# Patient Record
Sex: FEMALE | Race: WHITE | Employment: UNEMPLOYED | URBAN - METROPOLITAN AREA
[De-identification: names, ages, dates, MRNs, and addresses within clinical notes are randomized per-mention and may not be internally consistent; named-entity substitution may affect disease eponyms.]

---

## 2020-02-07 PROBLEM — O43.199 MARGINAL INSERTION OF UMBILICAL CORD AFFECTING MANAGEMENT OF MOTHER: Status: ACTIVE | Noted: 2020-02-07

## 2020-02-07 PROBLEM — Z34.00 NORMAL PREGNANCY, FIRST: Status: ACTIVE | Noted: 2020-02-07

## 2020-04-15 PROBLEM — O99.013 ANEMIA AFFECTING PREGNANCY IN THIRD TRIMESTER: Status: ACTIVE | Noted: 2020-04-15

## 2020-06-10 ENCOUNTER — HOSPITAL ENCOUNTER (OUTPATIENT)
Dept: LAB | Age: 20
Discharge: HOME | End: 2020-06-10
Payer: COMMERCIAL

## 2020-06-10 DIAGNOSIS — O99.013 ANEMIA AFFECTING PREGNANCY IN THIRD TRIMESTER: ICD-10-CM

## 2020-06-10 LAB
ALBUMIN SERPL-MCNC: 2.8 G/DL (ref 3.5–5)
ALBUMIN/GLOB SERPL: 0.7 {RATIO} (ref 1.2–3.5)
ALP SERPL-CCNC: 190 U/L (ref 50–136)
ALT SERPL-CCNC: 25 U/L (ref 12–65)
ANION GAP SERPL CALC-SCNC: 10 MMOL/L (ref 7–16)
APPEARANCE UR: ABNORMAL
AST SERPL-CCNC: 19 U/L (ref 15–37)
BACTERIA URNS QL MICRO: ABNORMAL /HPF
BASOPHILS # BLD: 0.1 K/UL (ref 0–0.2)
BASOPHILS NFR BLD: 0 % (ref 0–2)
BILIRUB SERPL-MCNC: 0.3 MG/DL (ref 0.2–1.1)
BILIRUB UR QL: NEGATIVE
BUN SERPL-MCNC: 4 MG/DL (ref 6–23)
CALCIUM SERPL-MCNC: 8.6 MG/DL (ref 8.3–10.4)
CASTS URNS QL MICRO: 0 /LPF
CHLORIDE SERPL-SCNC: 105 MMOL/L (ref 98–107)
CO2 SERPL-SCNC: 21 MMOL/L (ref 21–32)
COLOR UR: YELLOW
CREAT SERPL-MCNC: 0.7 MG/DL (ref 0.6–1)
CRYSTALS URNS QL MICRO: 0 /LPF
DIFFERENTIAL METHOD BLD: ABNORMAL
EOSINOPHIL # BLD: 0.3 K/UL (ref 0–0.8)
EOSINOPHIL NFR BLD: 2 % (ref 0.5–7.8)
EPI CELLS #/AREA URNS HPF: ABNORMAL /HPF
ERYTHROCYTE [DISTWIDTH] IN BLOOD BY AUTOMATED COUNT: 14.5 % (ref 11.9–14.6)
ERYTHROCYTE [SEDIMENTATION RATE] IN BLOOD: 59 MM/HR (ref 0–20)
FERRITIN SERPL-MCNC: 4 NG/ML (ref 8–388)
FOLATE SERPL-MCNC: 10.9 NG/ML (ref 3.1–17.5)
GLOBULIN SER CALC-MCNC: 4 G/DL (ref 2.3–3.5)
GLUCOSE SERPL-MCNC: 156 MG/DL (ref 65–100)
GLUCOSE UR STRIP.AUTO-MCNC: NEGATIVE MG/DL
HCT VFR BLD AUTO: 30.5 % (ref 35.8–46.3)
HGB BLD-MCNC: 9.5 G/DL (ref 11.7–15.4)
HGB RETIC QN AUTO: 25 PG (ref 29–35)
HGB UR QL STRIP: ABNORMAL
IGA SERPL-MCNC: 203 MG/DL (ref 85–499)
IGG SERPL-MCNC: 731 MG/DL (ref 610–1616)
IGM SERPL-MCNC: 95 MG/DL (ref 35–242)
IMM GRANULOCYTES # BLD AUTO: 0.4 K/UL (ref 0–0.5)
IMM GRANULOCYTES NFR BLD AUTO: 2 % (ref 0–5)
IMM RETICS NFR: 35.4 % (ref 3–15.9)
IRON SATN MFR SERPL: 4 %
IRON SERPL-MCNC: 24 UG/DL (ref 35–150)
KETONES UR QL STRIP.AUTO: 15 MG/DL
LDH SERPL L TO P-CCNC: 190 U/L (ref 100–190)
LEUKOCYTE ESTERASE UR QL STRIP.AUTO: ABNORMAL
LYMPHOCYTES # BLD: 2.8 K/UL (ref 0.5–4.6)
LYMPHOCYTES NFR BLD: 18 % (ref 13–44)
MCH RBC QN AUTO: 27 PG (ref 26.1–32.9)
MCHC RBC AUTO-ENTMCNC: 31.1 G/DL (ref 31.4–35)
MCV RBC AUTO: 86.6 FL (ref 79.6–97.8)
MONOCYTES # BLD: 0.7 K/UL (ref 0.1–1.3)
MONOCYTES NFR BLD: 4 % (ref 4–12)
MUCOUS THREADS URNS QL MICRO: 0 /LPF
NEUTS SEG # BLD: 11.2 K/UL (ref 1.7–8.2)
NEUTS SEG NFR BLD: 73 % (ref 43–78)
NITRITE UR QL STRIP.AUTO: POSITIVE
NRBC # BLD: 0.03 K/UL (ref 0–0.2)
OTHER OBSERVATIONS,UCOM: ABNORMAL
PH UR STRIP: 7 [PH] (ref 5–9)
PHOSPHATE SERPL-MCNC: 3.5 MG/DL (ref 2.5–4.5)
PLATELET # BLD AUTO: 269 K/UL (ref 150–450)
PMV BLD AUTO: 10.6 FL (ref 9.4–12.3)
POTASSIUM SERPL-SCNC: 3.6 MMOL/L (ref 3.5–5.1)
PROT SERPL-MCNC: 6.8 G/DL (ref 6.3–8.2)
PROT UR STRIP-MCNC: 30 MG/DL
RBC # BLD AUTO: 3.52 M/UL (ref 4.05–5.25)
RBC #/AREA URNS HPF: ABNORMAL /HPF
RETICS # AUTO: 0.08 M/UL (ref 0.03–0.1)
RETICS/RBC NFR AUTO: 2.4 % (ref 0.3–2)
SODIUM SERPL-SCNC: 136 MMOL/L (ref 136–145)
SP GR UR REFRACTOMETRY: 1.02 (ref 1–1.02)
T4 FREE SERPL-MCNC: 1 NG/DL (ref 0.78–1.46)
TIBC SERPL-MCNC: 611 UG/DL (ref 250–450)
TSH SERPL DL<=0.005 MIU/L-ACNC: 1.74 UIU/ML (ref 0.36–3.74)
UA: UC IF INDICATED,UAUC: ABNORMAL
URATE SERPL-MCNC: 3 MG/DL (ref 2.6–6)
UROBILINOGEN UR QL STRIP.AUTO: 0.2 EU/DL (ref 0.2–1)
VIT B12 SERPL-MCNC: 278 PG/ML (ref 193–986)
WBC # BLD AUTO: 15.4 K/UL (ref 4.3–11.1)
WBC URNS QL MICRO: ABNORMAL /HPF

## 2020-06-10 PROCEDURE — 87186 SC STD MICRODIL/AGAR DIL: CPT

## 2020-06-10 PROCEDURE — 84443 ASSAY THYROID STIM HORMONE: CPT

## 2020-06-10 PROCEDURE — 83615 LACTATE (LD) (LDH) ENZYME: CPT

## 2020-06-10 PROCEDURE — 87088 URINE BACTERIA CULTURE: CPT

## 2020-06-10 PROCEDURE — 84550 ASSAY OF BLOOD/URIC ACID: CPT

## 2020-06-10 PROCEDURE — 80053 COMPREHEN METABOLIC PANEL: CPT

## 2020-06-10 PROCEDURE — 85046 RETICYTE/HGB CONCENTRATE: CPT

## 2020-06-10 PROCEDURE — 84100 ASSAY OF PHOSPHORUS: CPT

## 2020-06-10 PROCEDURE — 82607 VITAMIN B-12: CPT

## 2020-06-10 PROCEDURE — 82747 ASSAY OF FOLIC ACID RBC: CPT

## 2020-06-10 PROCEDURE — 85025 COMPLETE CBC W/AUTO DIFF WBC: CPT

## 2020-06-10 PROCEDURE — 81001 URINALYSIS AUTO W/SCOPE: CPT

## 2020-06-10 PROCEDURE — 83540 ASSAY OF IRON: CPT

## 2020-06-10 PROCEDURE — 86038 ANTINUCLEAR ANTIBODIES: CPT

## 2020-06-10 PROCEDURE — 83021 HEMOGLOBIN CHROMOTOGRAPHY: CPT

## 2020-06-10 PROCEDURE — 82784 ASSAY IGA/IGD/IGG/IGM EACH: CPT

## 2020-06-10 PROCEDURE — 84238 ASSAY NONENDOCRINE RECEPTOR: CPT

## 2020-06-10 PROCEDURE — 36415 COLL VENOUS BLD VENIPUNCTURE: CPT

## 2020-06-10 PROCEDURE — 82728 ASSAY OF FERRITIN: CPT

## 2020-06-10 PROCEDURE — 85652 RBC SED RATE AUTOMATED: CPT

## 2020-06-10 PROCEDURE — 87086 URINE CULTURE/COLONY COUNT: CPT

## 2020-06-10 PROCEDURE — 84439 ASSAY OF FREE THYROXINE: CPT

## 2020-06-10 PROCEDURE — 82746 ASSAY OF FOLIC ACID SERUM: CPT

## 2020-06-11 PROBLEM — D50.9 IDA (IRON DEFICIENCY ANEMIA): Status: ACTIVE | Noted: 2020-06-11

## 2020-06-11 LAB
ANA SER QL: NEGATIVE
DEPRECATED HGB OTHER BLD-IMP: 0 %
FOLATE BLD-MCNC: 298 NG/ML
FOLATE RBC-MCNC: 980 NG/ML
HCT VFR BLD AUTO: 30.4 % (ref 34–46.6)
HGB A MFR BLD: 98.2 % (ref 96.4–98.8)
HGB A2 MFR BLD COLUMN CHROM: 1.8 % (ref 1.8–3.2)
HGB C MFR BLD: 0 %
HGB F MFR BLD: 0 % (ref 0–2)
HGB FRACT BLD-IMP: NORMAL
HGB S BLD QL SOLY: NEGATIVE
HGB S MFR BLD: 0 %
PERIPHERAL SMEAR,PSM: NORMAL
TRANSFERRIN SERPL-SCNC: 39.6 NMOL/L (ref 12.2–27.3)

## 2020-06-11 RX ORDER — HYDROCORTISONE SODIUM SUCCINATE 100 MG/2ML
100 INJECTION, POWDER, FOR SOLUTION INTRAMUSCULAR; INTRAVENOUS AS NEEDED
Status: CANCELLED | OUTPATIENT
Start: 2020-06-25

## 2020-06-11 RX ORDER — HEPARIN 100 UNIT/ML
300-500 SYRINGE INTRAVENOUS AS NEEDED
Status: CANCELLED
Start: 2020-06-18

## 2020-06-11 RX ORDER — ACETAMINOPHEN 325 MG/1
650 TABLET ORAL AS NEEDED
Status: CANCELLED
Start: 2020-06-18

## 2020-06-11 RX ORDER — EPINEPHRINE 1 MG/ML
0.3 INJECTION, SOLUTION, CONCENTRATE INTRAVENOUS AS NEEDED
Status: CANCELLED | OUTPATIENT
Start: 2020-06-25

## 2020-06-11 RX ORDER — ONDANSETRON 2 MG/ML
8 INJECTION INTRAMUSCULAR; INTRAVENOUS AS NEEDED
Status: CANCELLED | OUTPATIENT
Start: 2020-06-25

## 2020-06-11 RX ORDER — ALBUTEROL SULFATE 0.83 MG/ML
2.5 SOLUTION RESPIRATORY (INHALATION) AS NEEDED
Status: CANCELLED
Start: 2020-06-18

## 2020-06-11 RX ORDER — EPINEPHRINE 1 MG/ML
0.3 INJECTION, SOLUTION, CONCENTRATE INTRAVENOUS AS NEEDED
Status: CANCELLED | OUTPATIENT
Start: 2020-06-18

## 2020-06-11 RX ORDER — ACETAMINOPHEN 325 MG/1
650 TABLET ORAL AS NEEDED
Status: CANCELLED
Start: 2020-06-25

## 2020-06-11 RX ORDER — DIPHENHYDRAMINE HYDROCHLORIDE 50 MG/ML
50 INJECTION, SOLUTION INTRAMUSCULAR; INTRAVENOUS AS NEEDED
Status: CANCELLED
Start: 2020-06-25

## 2020-06-11 RX ORDER — HEPARIN 100 UNIT/ML
300-500 SYRINGE INTRAVENOUS AS NEEDED
Status: CANCELLED
Start: 2020-06-25

## 2020-06-11 RX ORDER — SODIUM CHLORIDE 0.9 % (FLUSH) 0.9 %
10 SYRINGE (ML) INJECTION AS NEEDED
Status: CANCELLED
Start: 2020-06-25

## 2020-06-11 RX ORDER — SODIUM CHLORIDE 9 MG/ML
10 INJECTION INTRAMUSCULAR; INTRAVENOUS; SUBCUTANEOUS AS NEEDED
Status: CANCELLED | OUTPATIENT
Start: 2020-06-25

## 2020-06-11 RX ORDER — HYDROCORTISONE SODIUM SUCCINATE 100 MG/2ML
100 INJECTION, POWDER, FOR SOLUTION INTRAMUSCULAR; INTRAVENOUS AS NEEDED
Status: CANCELLED | OUTPATIENT
Start: 2020-06-18

## 2020-06-11 RX ORDER — DIPHENHYDRAMINE HYDROCHLORIDE 50 MG/ML
25 INJECTION, SOLUTION INTRAMUSCULAR; INTRAVENOUS AS NEEDED
Status: CANCELLED
Start: 2020-06-25

## 2020-06-11 RX ORDER — DIPHENHYDRAMINE HYDROCHLORIDE 50 MG/ML
50 INJECTION, SOLUTION INTRAMUSCULAR; INTRAVENOUS AS NEEDED
Status: CANCELLED
Start: 2020-06-18

## 2020-06-11 RX ORDER — SODIUM CHLORIDE 9 MG/ML
10 INJECTION INTRAMUSCULAR; INTRAVENOUS; SUBCUTANEOUS AS NEEDED
Status: CANCELLED | OUTPATIENT
Start: 2020-06-18

## 2020-06-11 RX ORDER — SODIUM CHLORIDE 0.9 % (FLUSH) 0.9 %
10 SYRINGE (ML) INJECTION AS NEEDED
Status: CANCELLED
Start: 2020-06-18

## 2020-06-11 RX ORDER — ALBUTEROL SULFATE 0.83 MG/ML
2.5 SOLUTION RESPIRATORY (INHALATION) AS NEEDED
Status: CANCELLED
Start: 2020-06-25

## 2020-06-12 LAB
BACTERIA SPEC CULT: ABNORMAL
SERVICE CMNT-IMP: ABNORMAL

## 2020-06-15 ENCOUNTER — HOSPITAL ENCOUNTER (OUTPATIENT)
Age: 20
Discharge: HOME | End: 2020-06-15
Attending: OBSTETRICS & GYNECOLOGY | Admitting: OBSTETRICS & GYNECOLOGY
Payer: COMMERCIAL

## 2020-06-15 PROBLEM — O26.893 VAGINAL DISCHARGE DURING PREGNANCY IN THIRD TRIMESTER: Status: ACTIVE | Noted: 2020-06-15

## 2020-06-15 PROBLEM — O26.893 PELVIC PAIN IN ANTEPARTUM PERIOD IN THIRD TRIMESTER: Status: ACTIVE | Noted: 2020-06-15

## 2020-06-15 PROBLEM — N89.8 VAGINAL DISCHARGE DURING PREGNANCY IN THIRD TRIMESTER: Status: ACTIVE | Noted: 2020-06-15

## 2020-06-15 PROBLEM — R10.2 PELVIC PAIN IN ANTEPARTUM PERIOD IN THIRD TRIMESTER: Status: ACTIVE | Noted: 2020-06-15

## 2020-06-15 PROCEDURE — 74011250636 HC RX REV CODE- 250/636: Performed by: OBSTETRICS & GYNECOLOGY

## 2020-06-15 PROCEDURE — 96372 THER/PROPH/DIAG INJ SC/IM: CPT

## 2020-06-15 PROCEDURE — 74011250637 HC RX REV CODE- 250/637: Performed by: OBSTETRICS & GYNECOLOGY

## 2020-06-15 PROCEDURE — 81002 URINALYSIS NONAUTO W/O SCOPE: CPT | Performed by: OBSTETRICS & GYNECOLOGY

## 2020-06-15 PROCEDURE — 75810000275 HC EMERGENCY DEPT VISIT NO LEVEL OF CARE

## 2020-06-15 PROCEDURE — 59025 FETAL NON-STRESS TEST: CPT

## 2020-06-15 PROCEDURE — 84112 EVAL AMNIOTIC FLUID PROTEIN: CPT | Performed by: OBSTETRICS & GYNECOLOGY

## 2020-06-15 PROCEDURE — 99283 EMERGENCY DEPT VISIT LOW MDM: CPT

## 2020-06-15 RX ORDER — PHENAZOPYRIDINE HYDROCHLORIDE 95 MG/1
200 TABLET ORAL
Status: COMPLETED | OUTPATIENT
Start: 2020-06-15 | End: 2020-06-15

## 2020-06-15 RX ORDER — PROMETHAZINE HYDROCHLORIDE 25 MG/ML
12.5 INJECTION, SOLUTION INTRAMUSCULAR; INTRAVENOUS
Status: COMPLETED | OUTPATIENT
Start: 2020-06-15 | End: 2020-06-15

## 2020-06-15 RX ORDER — AMOXICILLIN 500 MG/1
500 CAPSULE ORAL
Status: COMPLETED | OUTPATIENT
Start: 2020-06-15 | End: 2020-06-15

## 2020-06-15 RX ORDER — BUTORPHANOL TARTRATE 2 MG/ML
1 INJECTION INTRAMUSCULAR; INTRAVENOUS
Status: COMPLETED | OUTPATIENT
Start: 2020-06-15 | End: 2020-06-15

## 2020-06-15 RX ADMIN — BUTORPHANOL TARTRATE 1 MG: 2 INJECTION, SOLUTION INTRAMUSCULAR; INTRAVENOUS at 20:18

## 2020-06-15 RX ADMIN — PROMETHAZINE HYDROCHLORIDE 12.5 MG: 25 INJECTION INTRAMUSCULAR; INTRAVENOUS at 20:18

## 2020-06-15 RX ADMIN — AMOXICILLIN 500 MG: 500 CAPSULE ORAL at 20:18

## 2020-06-15 RX ADMIN — URINARY PAIN RELIEF 190 MG: 95 TABLET ORAL at 20:17

## 2020-06-15 NOTE — PROGRESS NOTES
Patient presents with complaints of leaking fluid and contractions since 1600 no bleeding good fetal movement.

## 2020-06-15 NOTE — ED TRIAGE NOTES
Pt states she 37 weeks pregnant, pt of [facility]. Pt reports she is having lower abdominal pain and low back pain. Pt was told to come to ER.     Gregory Gunn RN

## 2020-06-16 VITALS
RESPIRATION RATE: 18 BRPM | HEART RATE: 102 BPM | SYSTOLIC BLOOD PRESSURE: 116 MMHG | OXYGEN SATURATION: 99 % | DIASTOLIC BLOOD PRESSURE: 75 MMHG | TEMPERATURE: 97.6 F | WEIGHT: 139 LBS | BODY MASS INDEX: 25.58 KG/M2 | HEIGHT: 62 IN

## 2020-06-16 LAB
A1 MICROGLOB PLACENTAL VAG QL: NEGATIVE
CONTROL LINE PRESENT?: NORMAL
EXPIRATION DATE: NORMAL
GLUCOSE, GLUUPC: NEGATIVE
INTERNAL NEGATIVE CONTROL: NORMAL
KETONES UR-MCNC: NORMAL MG/DL
KIT LOT NO.: NORMAL
PROT UR QL: NORMAL

## 2020-06-16 NOTE — PROGRESS NOTES
6/15/2020      RE: Silviadom Carmela      To Whom it May Concern: This is to certify that Jason Carmela may to work 06/17/2020      Please feel free to contact my office if you have any questions or concerns. Thank you for your assistance in this matter.     Sincerely,      Leidy Echols

## 2020-06-16 NOTE — ED PROVIDER NOTES
Chief Complaint: painful contractions and leaking of fluid      22 y.o. female G1 at 37w0d  weeks gestation who is seen for painful contractions and leaking of fluid. Contractions began around 15:00 and leaking around 16:00. Contractions are painful. Leaking is very small amounts of fluid- no gushes. No other c/o. Pt was diagnosed with a uti today. She has not yet filled her prescription for amoxicillin. HISTORY:    Social History     Substance and Sexual Activity   Sexual Activity Yes    Partners: Male    Birth control/protection: None     Patient's last menstrual period was 09/24/2019.     Social History     Socioeconomic History    Marital status: SINGLE     Spouse name: Not on file    Number of children: Not on file    Years of education: Not on file    Highest education level: Not on file   Occupational History    Not on file   Social Needs    Financial resource strain: Not on file    Food insecurity     Worry: Not on file     Inability: Not on file    Transportation needs     Medical: Not on file     Non-medical: Not on file   Tobacco Use    Smoking status: Never Smoker    Smokeless tobacco: Never Used   Substance and Sexual Activity    Alcohol use: Not Currently    Drug use: Never    Sexual activity: Yes     Partners: Male     Birth control/protection: None   Lifestyle    Physical activity     Days per week: Not on file     Minutes per session: Not on file    Stress: Not on file   Relationships    Social connections     Talks on phone: Not on file     Gets together: Not on file     Attends Protestant service: Not on file     Active member of club or organization: Not on file     Attends meetings of clubs or organizations: Not on file     Relationship status: Not on file    Intimate partner violence     Fear of current or ex partner: Not on file     Emotionally abused: Not on file     Physically abused: Not on file     Forced sexual activity: Not on file   Other Topics Concern    Not on file   Social History Narrative    Not on file       History reviewed. No pertinent surgical history. Past Medical History:   Diagnosis Date    Anemia affecting pregnancy in third trimester 4/15/2020    Anxiety          ROS:  A 12 point review of symptoms negative except for chief complaint as described above. PHYSICAL EXAM:  Blood pressure 117/77, pulse 77, temperature 97.6 °F (36.4 °C), resp. rate 18, height 5' 2\" (1.575 m), weight 63 kg (139 lb), last menstrual period 09/24/2019, SpO2 99 %, unknown if currently breastfeeding. Constitutional: The patient appears in pain, alert, oriented x 3. Cardiovascular: Heart RRR, no murmurs.    Respiratory: Lungs clear, no respiratory distress  GI: Abdomen soft, nontender, no guarding  No fundal tenderness  Musculoskeletal: no cva tenderness  Upper ext: no edema, reflexes +2  Lower ext: no edema, neg floyd's, reflexes +2  Skin: no rashes or lesions  Psychiatric:Mood/ Affect: anxious  Genitourinary: SVE: cl/th; no discharge  FHT:reactive, cat 1  TOCO:irreg contractions  amnisure- neg  Ua- + protein    I personally reviewed pt's medical record including relevant labs and ultrasounds  I reviewed the NST at today's encounter    Assessment/Plan:  23 yo G1 at 37 weeks with painful contractions, but no cervical change  Neg srom based on exam and amnisure  Reactive nst  Pt has proteinuria- she is currently being treated for a uti, normal bp , no edema  Will give amoxicillin and pyridium so pt can fill rx tomorrow  Therapeutic rest with stadol/ phenergan IM  Home with precautions    Clinical impression- false labor  uti

## 2020-06-16 NOTE — DISCHARGE INSTRUCTIONS
Patient Education        Early Stage of Labor at Home: Care Instructions  Your Care Instructions     If you came to the hospital while in early labor, your doctor may have asked if you want to labor at home until your contractions are stronger. Many women stay at home during early labor. This is often the longest part of the birthing process. It may last up to 2 to 3 days. Contractions are mild to moderate and shorter (about 30 to 45 seconds). You can usually keep talking during them. Contractions may also be irregular, about 5 to 20 minutes apart. They may even stop for a while. It helps to stay as relaxed as you can during this time. You can spend some or all of your early labor at home or anywhere else you may be comfortable. If you live far from the hospital or birthing center, you may want to think about going somewhere nearby so you can get back to the hospital quickly. For some women, there may be benefits to staying home during early labor, such as avoiding medicines or procedures. As labor progresses, you'll shift from early labor to active labor. During this time, contractions get more intense. They occur more often, about every 2 to 3 minutes. They also last longer, about 50 to 70 seconds. You will feel them even when you change positions and walk or move around. It may be hard to tell if you are in active labor. If you aren't sure, call your doctor or midwife. As your labor progresses, check in with your doctor or midwife about when to come back to the hospital or birthing center. You may have special instructions if your water broke or you tested positive for group B strep. Follow-up care is a key part of your treatment and safety. Be sure to make and go to all appointments, and call your doctor if you are having problems. It's also a good idea to know your test results and keep a list of the medicines you take. How can you care for yourself at home? · Get support.  Having a support person with you from early labor until after childbirth can have a positive effect on childbirth. · Find distractions. During early labor, you can walk, play cards, watch TV, or listen to music to help take your mind off your contractions. · Ask your partner, labor , or  for a massage. Shoulder and low back massage during contractions may ease your pain. Strong massage of the back muscles (counterpressure) during contractions may help relieve the pain of back labor. Tell your labor  exactly where to push and how hard to push. · Use imagery. This means using your imagination to decrease your pain. For instance, to help manage pain, picture your contractions as waves rolling over you. Picture a peaceful place, such as a beach or mountain stream, to help you relax between contractions. · Change positions during labor. Walking, kneeling, or sitting on a big rubber ball (birth ball) are good options. · Use focused breathing techniques. Breathing in a rhythm can distract you from pain. · Take a warm shower or bath. Warm water may ease pain and stress. When should you call for help? Call 911 anytime you think you may need emergency care. For example, call if:  · You passed out (lost consciousness). · You have a seizure. · You have severe vaginal bleeding. · You have severe pain in your belly or pelvis. · You have had fluid gushing or leaking from your vagina and you know or think the umbilical cord is bulging into your vagina. If this happens, immediately get down on your knees so your rear end (buttocks) is higher than your head. This will decrease the pressure on the cord until help arrives. Call your doctor now or seek immediate medical care if:  · You have new or worse signs of preeclampsia, such as:  ? Sudden swelling of your face, hands, or feet. ? New vision problems (such as dimness, blurring, or seeing spots). ? A severe headache. · You have any vaginal bleeding.   · You have belly pain or cramping. · You have a fever. · You have had regular contractions (with or without pain) for an hour. This means that you have 8 or more within 1 hour or 4 or more in 20 minutes after you change your position and drink fluids. · You have a sudden release of fluid from your vagina. · You have low back pain or pelvic pressure that does not go away. · You notice that your baby has stopped moving or is moving much less than normal.  Watch closely for changes in your health, and be sure to contact your doctor if you have any problems. Where can you learn more? Go to http://guillermina-rico.info/  Enter [id number] in the search box to learn more about \"Early Stage of Labor at Home: Care Instructions. \"  Current as of: February 11, 2020               Content Version: 12.5  © 3631-7936 [employer], Incorporated. Care instructions adapted under license by [employer] (which disclaims liability or warranty for this information). If you have questions about a medical condition or this instruction, always ask your healthcare professional. [address] any warranty or liability for your use of this information.

## 2020-06-16 NOTE — PROGRESS NOTES
Patient given 1mg Stadol and 12.5mg of Phenergan IM for pain. Patient discharged to home in stable condition with kick counts and labor precautions. Patient and family member verbalize understanding.

## 2020-06-17 PROBLEM — O12.13 PROTEINURIA AFFECTING PREGNANCY IN THIRD TRIMESTER: Status: ACTIVE | Noted: 2020-06-17

## 2020-06-18 ENCOUNTER — HOSPITAL ENCOUNTER (OUTPATIENT)
Dept: INFUSION THERAPY | Age: 20
Discharge: HOME | End: 2020-06-18
Payer: COMMERCIAL

## 2020-06-18 VITALS
DIASTOLIC BLOOD PRESSURE: 68 MMHG | HEART RATE: 102 BPM | TEMPERATURE: 97.6 F | RESPIRATION RATE: 16 BRPM | OXYGEN SATURATION: 100 % | SYSTOLIC BLOOD PRESSURE: 124 MMHG

## 2020-06-18 DIAGNOSIS — D50.9 IRON DEFICIENCY ANEMIA, UNSPECIFIED IRON DEFICIENCY ANEMIA TYPE: Primary | ICD-10-CM

## 2020-06-18 PROCEDURE — 74011250636 HC RX REV CODE- 250/636: Performed by: PEDIATRICS

## 2020-06-18 PROCEDURE — 96365 THER/PROPH/DIAG IV INF INIT: CPT

## 2020-06-18 PROCEDURE — 96361 HYDRATE IV INFUSION ADD-ON: CPT

## 2020-06-18 RX ORDER — DIPHENHYDRAMINE HYDROCHLORIDE 50 MG/ML
25 INJECTION, SOLUTION INTRAMUSCULAR; INTRAVENOUS AS NEEDED
Status: ACTIVE | OUTPATIENT
Start: 2020-06-18 | End: 2020-06-18

## 2020-06-18 RX ORDER — ONDANSETRON 2 MG/ML
8 INJECTION INTRAMUSCULAR; INTRAVENOUS AS NEEDED
Status: ACTIVE | OUTPATIENT
Start: 2020-06-18 | End: 2020-06-18

## 2020-06-18 RX ADMIN — SODIUM CHLORIDE 500 ML: 900 INJECTION, SOLUTION INTRAVENOUS at 11:51

## 2020-06-18 RX ADMIN — SODIUM CHLORIDE 750 MG: 900 INJECTION, SOLUTION INTRAVENOUS at 11:30

## 2020-06-18 NOTE — PROGRESS NOTES
Arrived to infusion. First dose of Injectafer given and tolerated well. Patient denies concerns or issues r/t infusion. Next appointment is 6/25/20 at 230pm.   Discharged ambulatory with self.

## 2020-06-18 NOTE — PROGRESS NOTES
I talked with patient regarding the FAP and the [address], patient stated her [employer] covers all charges and services at 100% under her maternity coverage because she is currently pregnant.

## 2020-06-25 ENCOUNTER — HOSPITAL ENCOUNTER (OUTPATIENT)
Dept: INFUSION THERAPY | Age: 20
Discharge: HOME | End: 2020-06-25
Payer: COMMERCIAL

## 2020-06-25 VITALS
OXYGEN SATURATION: 99 % | SYSTOLIC BLOOD PRESSURE: 123 MMHG | TEMPERATURE: 98.2 F | RESPIRATION RATE: 16 BRPM | HEART RATE: 115 BPM | DIASTOLIC BLOOD PRESSURE: 66 MMHG

## 2020-06-25 DIAGNOSIS — D50.9 IRON DEFICIENCY ANEMIA, UNSPECIFIED IRON DEFICIENCY ANEMIA TYPE: Primary | ICD-10-CM

## 2020-06-25 PROCEDURE — 96365 THER/PROPH/DIAG IV INF INIT: CPT

## 2020-06-25 PROCEDURE — 74011250636 HC RX REV CODE- 250/636: Performed by: PEDIATRICS

## 2020-06-25 RX ORDER — SODIUM CHLORIDE 0.9 % (FLUSH) 0.9 %
10 SYRINGE (ML) INJECTION AS NEEDED
Status: DISCONTINUED | OUTPATIENT
Start: 2020-06-25 | End: 2020-06-26 | Stop reason: HOSPADM

## 2020-06-25 RX ADMIN — SODIUM CHLORIDE 500 ML: 900 INJECTION, SOLUTION INTRAVENOUS at 14:51

## 2020-06-25 RX ADMIN — SODIUM CHLORIDE 750 MG: 900 INJECTION, SOLUTION INTRAVENOUS at 15:09

## 2020-06-25 RX ADMIN — Medication 10 ML: at 14:50

## 2020-06-25 NOTE — PROGRESS NOTES
Arrived to the [facility]. Injectafer completed. Patient tolerated well, and 30 minute wait time completed with no observed reaction. Any issues or concerns during appointment: none. No future appts, to follow up with MD regarding treatment. Discharged ambulatory to home.

## 2020-06-30 ENCOUNTER — HOSPITAL ENCOUNTER (INPATIENT)
Age: 20
LOS: 4 days | Discharge: HOME | End: 2020-07-04
Attending: OBSTETRICS & GYNECOLOGY | Admitting: OBSTETRICS & GYNECOLOGY
Payer: COMMERCIAL

## 2020-06-30 PROBLEM — Z34.90 ENCOUNTER FOR INDUCTION OF LABOR: Status: ACTIVE | Noted: 2020-06-30

## 2020-06-30 PROBLEM — Z3A.39 39 WEEKS GESTATION OF PREGNANCY: Status: ACTIVE | Noted: 2020-06-30

## 2020-06-30 LAB
ABO + RH BLD: NORMAL
ALBUMIN SERPL-MCNC: 2.9 G/DL (ref 3.5–5)
ALBUMIN/GLOB SERPL: 0.8 {RATIO} (ref 1.2–3.5)
ALP SERPL-CCNC: 221 U/L (ref 50–136)
ALT SERPL-CCNC: 34 U/L (ref 12–65)
ANION GAP SERPL CALC-SCNC: 11 MMOL/L (ref 7–16)
AST SERPL-CCNC: 31 U/L (ref 15–37)
BASOPHILS # BLD: 0.1 K/UL (ref 0–0.2)
BASOPHILS NFR BLD: 0 % (ref 0–2)
BILIRUB SERPL-MCNC: 0.3 MG/DL (ref 0.2–1.1)
BLOOD GROUP ANTIBODIES SERPL: NORMAL
BUN SERPL-MCNC: 7 MG/DL (ref 6–23)
CALCIUM SERPL-MCNC: 8.4 MG/DL (ref 8.3–10.4)
CHLORIDE SERPL-SCNC: 105 MMOL/L (ref 98–107)
CO2 SERPL-SCNC: 17 MMOL/L (ref 21–32)
CREAT SERPL-MCNC: 0.76 MG/DL (ref 0.6–1)
DIFFERENTIAL METHOD BLD: ABNORMAL
EOSINOPHIL # BLD: 0.3 K/UL (ref 0–0.8)
EOSINOPHIL NFR BLD: 2 % (ref 0.5–7.8)
ERYTHROCYTE [DISTWIDTH] IN BLOOD BY AUTOMATED COUNT: 20.7 % (ref 11.9–14.6)
GLOBULIN SER CALC-MCNC: 3.7 G/DL (ref 2.3–3.5)
GLUCOSE SERPL-MCNC: 136 MG/DL (ref 65–100)
HCT VFR BLD AUTO: 32.6 % (ref 35.8–46.3)
HGB BLD-MCNC: 10.5 G/DL (ref 11.7–15.4)
IMM GRANULOCYTES # BLD AUTO: 0.9 K/UL (ref 0–0.5)
IMM GRANULOCYTES NFR BLD AUTO: 5 % (ref 0–5)
LYMPHOCYTES # BLD: 3.2 K/UL (ref 0.5–4.6)
LYMPHOCYTES NFR BLD: 19 % (ref 13–44)
MCH RBC QN AUTO: 28.8 PG (ref 26.1–32.9)
MCHC RBC AUTO-ENTMCNC: 32.2 G/DL (ref 31.4–35)
MCV RBC AUTO: 89.6 FL (ref 79.6–97.8)
MONOCYTES # BLD: 0.9 K/UL (ref 0.1–1.3)
MONOCYTES NFR BLD: 5 % (ref 4–12)
NEUTS SEG # BLD: 12.1 K/UL (ref 1.7–8.2)
NEUTS SEG NFR BLD: 69 % (ref 43–78)
NRBC # BLD: 0.07 K/UL (ref 0–0.2)
PLATELET # BLD AUTO: 253 K/UL (ref 150–450)
PMV BLD AUTO: 11 FL (ref 9.4–12.3)
POTASSIUM SERPL-SCNC: 3.4 MMOL/L (ref 3.5–5.1)
PROT SERPL-MCNC: 6.6 G/DL (ref 6.3–8.2)
RBC # BLD AUTO: 3.64 M/UL (ref 4.05–5.2)
SODIUM SERPL-SCNC: 133 MMOL/L (ref 136–145)
SPECIMEN EXP DATE BLD: NORMAL
WBC # BLD AUTO: 17.4 K/UL (ref 4.3–11.1)

## 2020-06-30 PROCEDURE — 74011250637 HC RX REV CODE- 250/637: Performed by: OBSTETRICS & GYNECOLOGY

## 2020-06-30 PROCEDURE — 65270000029 HC RM PRIVATE

## 2020-06-30 PROCEDURE — 3E0P7VZ INTRODUCTION OF HORMONE INTO FEMALE REPRODUCTIVE, VIA NATURAL OR ARTIFICIAL OPENING: ICD-10-PCS | Performed by: OBSTETRICS & GYNECOLOGY

## 2020-06-30 PROCEDURE — 75410000002 HC LABOR FEE PER 1 HR: Performed by: OBSTETRICS & GYNECOLOGY

## 2020-06-30 PROCEDURE — 85025 COMPLETE CBC W/AUTO DIFF WBC: CPT

## 2020-06-30 PROCEDURE — 86900 BLOOD TYPING SEROLOGIC ABO: CPT

## 2020-06-30 PROCEDURE — 4A1HXCZ MONITORING OF PRODUCTS OF CONCEPTION, CARDIAC RATE, EXTERNAL APPROACH: ICD-10-PCS | Performed by: OBSTETRICS & GYNECOLOGY

## 2020-06-30 PROCEDURE — 80053 COMPREHEN METABOLIC PANEL: CPT

## 2020-06-30 RX ORDER — ZOLPIDEM TARTRATE 5 MG/1
5 TABLET ORAL
Status: DISCONTINUED | OUTPATIENT
Start: 2020-06-30 | End: 2020-07-02

## 2020-06-30 RX ORDER — BUTORPHANOL TARTRATE 2 MG/ML
1 INJECTION INTRAMUSCULAR; INTRAVENOUS
Status: DISCONTINUED | OUTPATIENT
Start: 2020-06-30 | End: 2020-07-02 | Stop reason: HOSPADM

## 2020-06-30 RX ORDER — OXYTOCIN/RINGER'S LACTATE 30/500 ML
0-25 PLASTIC BAG, INJECTION (ML) INTRAVENOUS
Status: DISCONTINUED | OUTPATIENT
Start: 2020-06-30 | End: 2020-07-02

## 2020-06-30 RX ORDER — LIDOCAINE HYDROCHLORIDE 10 MG/ML
1 INJECTION INFILTRATION; PERINEURAL
Status: ACTIVE | OUTPATIENT
Start: 2020-06-30 | End: 2020-07-01

## 2020-06-30 RX ORDER — LIDOCAINE HYDROCHLORIDE 20 MG/ML
JELLY TOPICAL
Status: ACTIVE | OUTPATIENT
Start: 2020-06-30 | End: 2020-07-01

## 2020-06-30 RX ORDER — ACETAMINOPHEN 325 MG/1
650 TABLET ORAL
Status: DISCONTINUED | OUTPATIENT
Start: 2020-06-30 | End: 2020-07-02

## 2020-06-30 RX ORDER — OXYTOCIN/RINGER'S LACTATE 30/500 ML
250 PLASTIC BAG, INJECTION (ML) INTRAVENOUS ONCE
Status: ACTIVE | OUTPATIENT
Start: 2020-06-30 | End: 2020-07-01

## 2020-06-30 RX ORDER — FAMOTIDINE 20 MG/1
20 TABLET, FILM COATED ORAL
Status: DISCONTINUED | OUTPATIENT
Start: 2020-06-30 | End: 2020-07-02

## 2020-06-30 RX ORDER — DEXTROSE, SODIUM CHLORIDE, SODIUM LACTATE, POTASSIUM CHLORIDE, AND CALCIUM CHLORIDE 5; .6; .31; .03; .02 G/100ML; G/100ML; G/100ML; G/100ML; G/100ML
125 INJECTION, SOLUTION INTRAVENOUS CONTINUOUS
Status: DISCONTINUED | OUTPATIENT
Start: 2020-06-30 | End: 2020-07-02

## 2020-06-30 RX ORDER — SODIUM CHLORIDE 0.9 % (FLUSH) 0.9 %
5-40 SYRINGE (ML) INJECTION AS NEEDED
Status: DISCONTINUED | OUTPATIENT
Start: 2020-06-30 | End: 2020-07-02

## 2020-06-30 RX ORDER — MINERAL OIL
120 OIL (ML) ORAL
Status: ACTIVE | OUTPATIENT
Start: 2020-06-30 | End: 2020-07-01

## 2020-06-30 RX ADMIN — ZOLPIDEM TARTRATE 5 MG: 5 TABLET ORAL at 23:59

## 2020-06-30 RX ADMIN — MISOPROSTOL 50 MCG: 100 TABLET ORAL at 20:05

## 2020-06-30 NOTE — PROGRESS NOTES
Patient ID verified. Allergies, medical history, prenatal record and prior to admission medications verified. Pt instructed to eat prior to admission. Pt instructed  to call the hospital at 1500 come to entrance C and sign in at the registration desk on the 4th floor. Patient instructed to come to hospital sooner if SROM, labor, or concerning symptoms. Patient verbalized understanding. Questions encouraged and answered.

## 2020-06-30 NOTE — PROGRESS NOTES
Pt presented for scheduled induction. Pt states she had a phone interview with [address] [place] [address] RN and the information that was obtained has not changed. POC reviewed. IV started, Consents witnessed. Lab work drawn, sent to lab.

## 2020-07-01 ENCOUNTER — ANESTHESIA EVENT (OUTPATIENT)
Dept: LABOR AND DELIVERY | Age: 20
End: 2020-07-01
Payer: COMMERCIAL

## 2020-07-01 ENCOUNTER — ANESTHESIA (OUTPATIENT)
Dept: LABOR AND DELIVERY | Age: 20
End: 2020-07-01
Payer: COMMERCIAL

## 2020-07-01 PROCEDURE — 74011250637 HC RX REV CODE- 250/637: Performed by: OBSTETRICS & GYNECOLOGY

## 2020-07-01 PROCEDURE — 74011000250 HC RX REV CODE- 250: Performed by: OBSTETRICS & GYNECOLOGY

## 2020-07-01 PROCEDURE — 74011250636 HC RX REV CODE- 250/636: Performed by: OBSTETRICS & GYNECOLOGY

## 2020-07-01 PROCEDURE — 74011250636 HC RX REV CODE- 250/636: Performed by: NURSE ANESTHETIST, CERTIFIED REGISTERED

## 2020-07-01 PROCEDURE — A4300 CATH IMPL VASC ACCESS PORTAL: HCPCS | Performed by: ANESTHESIOLOGY

## 2020-07-01 PROCEDURE — 77030014125 HC TY EPDRL BBMI -B: Performed by: ANESTHESIOLOGY

## 2020-07-01 PROCEDURE — 74011000250 HC RX REV CODE- 250: Performed by: ANESTHESIOLOGY

## 2020-07-01 PROCEDURE — 65270000029 HC RM PRIVATE

## 2020-07-01 PROCEDURE — 75410000002 HC LABOR FEE PER 1 HR

## 2020-07-01 RX ORDER — ROPIVACAINE HYDROCHLORIDE 2 MG/ML
INJECTION, SOLUTION EPIDURAL; INFILTRATION; PERINEURAL
Status: DISCONTINUED | OUTPATIENT
Start: 2020-07-01 | End: 2020-07-02 | Stop reason: HOSPADM

## 2020-07-01 RX ORDER — ROPIVACAINE HYDROCHLORIDE 2 MG/ML
INJECTION, SOLUTION EPIDURAL; INFILTRATION; PERINEURAL AS NEEDED
Status: DISCONTINUED | OUTPATIENT
Start: 2020-07-01 | End: 2020-07-02 | Stop reason: HOSPADM

## 2020-07-01 RX ORDER — LIDOCAINE HYDROCHLORIDE AND EPINEPHRINE 15; 5 MG/ML; UG/ML
INJECTION, SOLUTION EPIDURAL
Status: COMPLETED | OUTPATIENT
Start: 2020-07-01 | End: 2020-07-01

## 2020-07-01 RX ORDER — ONDANSETRON 2 MG/ML
4 INJECTION INTRAMUSCULAR; INTRAVENOUS
Status: DISCONTINUED | OUTPATIENT
Start: 2020-07-01 | End: 2020-07-02

## 2020-07-01 RX ORDER — BUTORPHANOL TARTRATE 2 MG/ML
1 INJECTION INTRAMUSCULAR; INTRAVENOUS
Status: DISCONTINUED | OUTPATIENT
Start: 2020-07-01 | End: 2020-07-02

## 2020-07-01 RX ADMIN — Medication 2 MILLI-UNITS/MIN: at 14:17

## 2020-07-01 RX ADMIN — Medication 8 ML: at 16:15

## 2020-07-01 RX ADMIN — SODIUM CHLORIDE, SODIUM LACTATE, POTASSIUM CHLORIDE, CALCIUM CHLORIDE, AND DEXTROSE MONOHYDRATE 125 ML/HR: 600; 310; 30; 20; 5 INJECTION, SOLUTION INTRAVENOUS at 22:03

## 2020-07-01 RX ADMIN — MISOPROSTOL 50 MCG: 100 TABLET ORAL at 00:02

## 2020-07-01 RX ADMIN — SODIUM CHLORIDE, SODIUM LACTATE, POTASSIUM CHLORIDE, CALCIUM CHLORIDE, AND DEXTROSE MONOHYDRATE 125 ML/HR: 600; 310; 30; 20; 5 INJECTION, SOLUTION INTRAVENOUS at 14:10

## 2020-07-01 RX ADMIN — LIDOCAINE HYDROCHLORIDE,EPINEPHRINE BITARTRATE 5 ML: 15; .005 INJECTION, SOLUTION EPIDURAL; INFILTRATION; INTRACAUDAL; PERINEURAL at 16:14

## 2020-07-01 RX ADMIN — FAMOTIDINE 20 MG: 20 TABLET, FILM COATED ORAL at 20:13

## 2020-07-01 RX ADMIN — MISOPROSTOL 50 MCG: 100 TABLET ORAL at 04:06

## 2020-07-01 RX ADMIN — PROMETHAZINE HYDROCHLORIDE 12.5 MG: 25 INJECTION INTRAMUSCULAR; INTRAVENOUS at 14:09

## 2020-07-01 RX ADMIN — BUTORPHANOL TARTRATE 1 MG: 2 INJECTION, SOLUTION INTRAMUSCULAR; INTRAVENOUS at 14:09

## 2020-07-01 RX ADMIN — MISOPROSTOL 50 MCG: 100 TABLET ORAL at 10:00

## 2020-07-01 RX ADMIN — ONDANSETRON 4 MG: 2 INJECTION INTRAMUSCULAR; INTRAVENOUS at 16:34

## 2020-07-01 RX ADMIN — Medication 10 ML/HR: at 16:15

## 2020-07-01 NOTE — ANESTHESIA PREPROCEDURE EVALUATION
Relevant Problems   No relevant active problems       Anesthetic History   No history of anesthetic complications            Review of Systems / Medical History  Patient summary reviewed and pertinent labs reviewed    Pulmonary  Within defined limits                 Neuro/Psych         Headaches and psychiatric history    Comments: Hx of anxiety Cardiovascular                       GI/Hepatic/Renal  Within defined limits              Endo/Other        Anemia     Other Findings              Physical Exam    Airway  Mallampati: II  TM Distance: > 6 cm  Neck ROM: normal range of motion   Mouth opening: Normal     Cardiovascular    Rhythm: regular           Dental  No notable dental hx       Pulmonary                 Abdominal  GI exam deferred       Other Findings            Anesthetic Plan    ASA: 2  Anesthesia type: epidural            Anesthetic plan and risks discussed with: Patient

## 2020-07-01 NOTE — PROGRESS NOTES
1606- anesthesia to the room for epidural    1608- TO performed    1612- test dose given, see flowsheet for VS    1617- dose given

## 2020-07-01 NOTE — H&P
History & Physical    Name: Eugenio Borges MRN: [id number]  SSN: [id number]    YOB: 2000  Age: 22 y.o. Sex: female      Subjective:     Estimated Date of Delivery: 20  OB History    Para Term  AB Living   1             SAB TAB Ectopic Molar Multiple Live Births                    # Outcome Date GA Lbr Angelito/2nd Weight Sex Delivery Anes PTL Lv   1 Current                Ms. Aislinn Dominguez is a 17yo G1 admitted with pregnancy at 39w2d for induction of labor due to isolated proteinuria w/OUT any bp elevations at time of IOL. HELLP labs wnl. 24hr urine 330 (2020). Denies any HA, SOB/CP, RUQ pain, vision changes. Prenatal course also complicated by anemia requiring iron transfusions and marginal CI. Last growth US 2020: GP 49% (7#6), AC 91%, GAVIN 22. Please see prenatal records for details. Pt states she is only feeling any occasional painful ctx --rates 3/10 severity. No VB, no LOF. Past Medical History:   Diagnosis Date    Anemia affecting pregnancy in third trimester 4/15/2020    Anxiety      History reviewed. No pertinent surgical history. Social History     Occupational History    Not on file   Tobacco Use    Smoking status: Never Smoker    Smokeless tobacco: Never Used   Substance and Sexual Activity    Alcohol use: Not Currently    Drug use: Never    Sexual activity: Yes     Partners: Male     Birth control/protection: None     Family History   Problem Relation Age of Onset    Diabetes Mother         gdm    Diabetes Maternal Aunt     Diabetes Maternal Grandmother     Cancer Maternal Grandmother     Stroke Maternal Grandfather     Heart Disease Maternal Grandfather     Ovarian Cancer Neg Hx     Colon Cancer Neg Hx     Breast Cancer Neg Hx        Allergies   Allergen Reactions    Latex Rash and Swelling     Prior to Admission medications    Medication Sig Start Date End Date Taking?  Authorizing Provider   amoxicillin (AMOXIL) 500 mg capsule Take 500 mg by mouth. Yes Provider, Historical   PNV#16-Iron Fum & PS-FA-OM-3 35-1-200 mg cap Take  by mouth. Yes Provider, Historical        Review of Systems: A comprehensive review of systems was negative except for that written in the History of Present Illness. Objective:     Vitals:  Vitals:    06/30/20 1943 07/01/20 0004 07/01/20 0407 07/01/20 0730   BP: 115/64 137/70 128/66 119/75   Pulse: (!) 115 (!) 102 88 96   Resp:  18 18 18   Temp: 98.2 °F (36.8 °C) 98.4 °F (36.9 °C) 98.2 °F (36.8 °C) 98 °F (36.7 °C)   Height:            Physical Exam:  Constitutional: She appears well-developed and well-nourished. No distress. HENT:    Head: Normocephalic and atraumatic. Lungs: CTAB, effort normal, no rales/crackles  Cardiovascular: RRR, no M/R/G  Abd:  Gravid, no RUQ TTP  Skin: She is not diaphoretic. Psychiatric: She has a normal mood and affect. Her behavior is normal. Thought content normal.   Exts:  DTRs 2+, no c/c/e     SVE:  Tight 1/25/-3     FHTs Cat I     Prenatal Labs:   Lab Results   Component Value Date/Time    ABO/Rh(D) O POSITIVE 06/30/2020 07:35 PM       Counseling:  Factual information regarding the medical condition and the need for induction of labor was discussed with the patient, who verbalized understanding. The therapeutic rationale, benefits, risks and potential complications were discussed, including the possibility of pain, bleeding, infection, loss of function, disfigurement, death or other unforeseen complications. Alternatives to the procedure were discussed (including potential risks, complications and benefits of each). No guarantee was expressed or implied as to the results of the procedure. Informed consent was given, as well as a request to proceed      Impression/Plan:     Active Problems:    39 weeks gestation of pregnancy (6/30/2020)      Encounter for induction of labor (6/30/2020)         Plan: Admit for induction of labor. Group B Strep negative.   Received Cytotec overnight.           Ced Macedo MD

## 2020-07-01 NOTE — PROGRESS NOTES
FB pulled on but has not delivered. SVE: 4/90/-2    Pt spontaneously SROM'd at some point as well. Continue w/ pit.   Will recheck FB in approx 2hrs        Erma Dennis MD

## 2020-07-01 NOTE — PROGRESS NOTES
Test dose BP's       07/01/20 1612 07/01/20 1613 07/01/20 1615   Maternal Vital Signs   Pulse (Heart Rate) 92 96 95   /64 104/61 113/62   MAP (Monitor) 79 75 82   MAP (Calculated) 78 75 79      07/01/20 1616 07/01/20 1617   Maternal Vital Signs   Pulse (Heart Rate) 99 96   /62 108/55   MAP (Monitor) 81 75   MAP (Calculated) 79 73

## 2020-07-01 NOTE — PROGRESS NOTES
Diann picking up contractions every 2-4 minutes, pt states she only feels contractions about every 10-15 minutes, rates pain 3/10, abdomen palpates soft with contractions spaced out, sve 1/50/-2. Pt given cytotec 50mcg buccal. Pt denies any needs at this time.

## 2020-07-01 NOTE — PROGRESS NOTES
Cook Kc Balloon placed w/out issue (60cc in intrauterine balloon); did not inflate vaginal balloon per pt preference. Start pit --increase to max 10 PRN while FB is in place.       Lesa Gutierrez MD

## 2020-07-01 NOTE — PROGRESS NOTES
Labor Progress Note Continue Labor  Patient seen, fetal heart rate and contraction pattern evaluated, patient examined. Pt started feeling ctxs stronger after last Cytotec. Rates 6/10. Patient Vitals for the past 8 hrs:   BP Temp Pulse Resp   20 1111 118/73  87    20 1100  97.8 °F (36.6 °C)     20 0730 119/75 98 °F (36.7 °C) 96 18     SVE:  /-3    Assessment/Plan:  19 y. o. at 39w2d for IOL due to isolated Proteinuria:    1) IOL: s/p Cytotec x4; counseled on Kc Balloon --pt desires to proceed.   Will pre-medicate w/ Stadol/Phenergan per pt preference    2) FHTs:  Cat I, R   3) GBS:  Alexsander Giron MD

## 2020-07-01 NOTE — ANESTHESIA PROCEDURE NOTES
Epidural Block    Start time: 7/1/2020 4:12 PM  End time: 7/1/2020 4:14 PM  Performed by: [place], MD  Authorized by: [place], MD     Pre-Procedure  Indication: labor epidural    Preanesthetic Checklist: patient identified, risks and benefits discussed, anesthesia consent, site marked, patient being monitored, timeout performed and anesthesia consent    Timeout Time: 16:12        Epidural:   Patient position:  Seated  Prep region:  Lumbar  Prep: Patient draped and Chlorhexidine    Location:  L2-3    Needle and Epidural Catheter:   Needle Type:  Tuohy  Needle Gauge:  17 G  Injection Technique:  Loss of resistance using air  Attempts:  1  Depth in Epidural Space (cm):  7  Events: no blood with aspiration, no cerebrospinal fluid with aspiration, no paresthesia and negative aspiration test    Test Dose:  Negative    Assessment:   Catheter Secured:  Tegaderm and tape  Insertion:  Uncomplicated  Patient tolerance:  Patient tolerated the procedure well with no immediate complications

## 2020-07-01 NOTE — PROGRESS NOTES
Pt assisted OOB to the bathroom, gait steady.  Pt complains of pain 6/10, bolus started for epidural.

## 2020-07-02 LAB
ARTERIAL PATENCY WRIST A: ABNORMAL
ARTERIAL PATENCY WRIST A: ABNORMAL
BASE DEFICIT BLD-SCNC: 5 MMOL/L
BASE DEFICIT BLD-SCNC: 7 MMOL/L
BASOPHILS # BLD: 0.1 K/UL (ref 0–0.2)
BASOPHILS NFR BLD: 0 % (ref 0–2)
BDY SITE: ABNORMAL
BDY SITE: ABNORMAL
CA-I BLD-MCNC: 1.4 MMOL/L (ref 1.12–1.32)
CA-I BLD-MCNC: 1.44 MMOL/L (ref 1.12–1.32)
COLLECT TIME,HTIME: 56
COLLECT TIME,HTIME: 56
DIFFERENTIAL METHOD BLD: ABNORMAL
EOSINOPHIL # BLD: 0.1 K/UL (ref 0–0.8)
EOSINOPHIL NFR BLD: 0 % (ref 0.5–7.8)
ERYTHROCYTE [DISTWIDTH] IN BLOOD BY AUTOMATED COUNT: 21.1 % (ref 11.9–14.6)
GAS FLOW.O2 O2 DELIVERY SYS: ABNORMAL L/MIN
GAS FLOW.O2 O2 DELIVERY SYS: ABNORMAL L/MIN
GLUCOSE BLD STRIP.AUTO-MCNC: 64 MG/DL (ref 65–100)
GLUCOSE BLD STRIP.AUTO-MCNC: 82 MG/DL (ref 65–100)
HCO3 BLD-SCNC: 18.2 MMOL/L (ref 22–26)
HCO3 BLD-SCNC: 23 MMOL/L (ref 22–26)
HCT VFR BLD AUTO: 29.3 % (ref 35.8–46.3)
HGB BLD-MCNC: 9.3 G/DL (ref 11.7–15.4)
IMM GRANULOCYTES # BLD AUTO: 0.2 K/UL (ref 0–0.5)
IMM GRANULOCYTES NFR BLD AUTO: 1 % (ref 0–5)
LYMPHOCYTES # BLD: 2.3 K/UL (ref 0.5–4.6)
LYMPHOCYTES NFR BLD: 12 % (ref 13–44)
MCH RBC QN AUTO: 28.9 PG (ref 26.1–32.9)
MCHC RBC AUTO-ENTMCNC: 31.7 G/DL (ref 31.4–35)
MCV RBC AUTO: 91 FL (ref 79.6–97.8)
MONOCYTES # BLD: 1.5 K/UL (ref 0.1–1.3)
MONOCYTES NFR BLD: 8 % (ref 4–12)
NEUTS SEG # BLD: 15.4 K/UL (ref 1.7–8.2)
NEUTS SEG NFR BLD: 79 % (ref 43–78)
NRBC # BLD: 0 K/UL (ref 0–0.2)
O2/TOTAL GAS SETTING VFR VENT: 21 %
O2/TOTAL GAS SETTING VFR VENT: 21 %
PCO2 BLD: 33.7 MMHG (ref 35–45)
PCO2 BLD: 51 MMHG (ref 35–45)
PH BLD: 7.26 [PH] (ref 7.35–7.45)
PH BLD: 7.34 [PH] (ref 7.35–7.45)
PLATELET # BLD AUTO: 207 K/UL (ref 150–450)
PMV BLD AUTO: 11.3 FL (ref 9.4–12.3)
PO2 BLD: 11 MMHG (ref 75–100)
PO2 BLD: 21 MMHG (ref 75–100)
POTASSIUM BLD-SCNC: 5.1 MMOL/L (ref 3.5–5.1)
POTASSIUM BLD-SCNC: 6.9 MMOL/L (ref 3.5–5.1)
RBC # BLD AUTO: 3.22 M/UL (ref 4.05–5.2)
SAO2 % BLD: 32 % (ref 95–98)
SAO2 % BLD: 9 % (ref 95–98)
SERVICE CMNT-IMP: ABNORMAL
SERVICE CMNT-IMP: ABNORMAL
SODIUM BLD-SCNC: 134 MMOL/L (ref 136–145)
SODIUM BLD-SCNC: 137 MMOL/L (ref 136–145)
SPECIMEN TYPE: ABNORMAL
SPECIMEN TYPE: ABNORMAL
WBC # BLD AUTO: 19.5 K/UL (ref 4.3–11.1)

## 2020-07-02 PROCEDURE — 76010000391 HC C SECN FIRST 1 HR: Performed by: OBSTETRICS & GYNECOLOGY

## 2020-07-02 PROCEDURE — 74011250636 HC RX REV CODE- 250/636: Performed by: OBSTETRICS & GYNECOLOGY

## 2020-07-02 PROCEDURE — 76060000078 HC EPIDURAL ANESTHESIA

## 2020-07-02 PROCEDURE — 77030031139 HC SUT VCRL2 J&J -A: Performed by: OBSTETRICS & GYNECOLOGY

## 2020-07-02 PROCEDURE — 59200 INSERT CERVICAL DILATOR: CPT

## 2020-07-02 PROCEDURE — 65270000029 HC RM PRIVATE

## 2020-07-02 PROCEDURE — 82803 BLOOD GASES ANY COMBINATION: CPT

## 2020-07-02 PROCEDURE — 74011250637 HC RX REV CODE- 250/637: Performed by: PEDIATRICS

## 2020-07-02 PROCEDURE — 74011250637 HC RX REV CODE- 250/637: Performed by: OBSTETRICS & GYNECOLOGY

## 2020-07-02 PROCEDURE — 77030002974 HC SUT PLN J&J -A: Performed by: OBSTETRICS & GYNECOLOGY

## 2020-07-02 PROCEDURE — 77030002966 HC SUT PDS J&J -A: Performed by: OBSTETRICS & GYNECOLOGY

## 2020-07-02 PROCEDURE — 77030018846 HC SOL IRR STRL H20 ICUM -A: Performed by: OBSTETRICS & GYNECOLOGY

## 2020-07-02 PROCEDURE — 76060000078 HC EPIDURAL ANESTHESIA: Performed by: OBSTETRICS & GYNECOLOGY

## 2020-07-02 PROCEDURE — 74011250636 HC RX REV CODE- 250/636: Performed by: NURSE ANESTHETIST, CERTIFIED REGISTERED

## 2020-07-02 PROCEDURE — 85025 COMPLETE CBC W/AUTO DIFF WBC: CPT

## 2020-07-02 PROCEDURE — 36415 COLL VENOUS BLD VENIPUNCTURE: CPT

## 2020-07-02 PROCEDURE — 77030018836 HC SOL IRR NACL ICUM -A: Performed by: OBSTETRICS & GYNECOLOGY

## 2020-07-02 PROCEDURE — 77030002888 HC SUT CHRMC J&J -A: Performed by: OBSTETRICS & GYNECOLOGY

## 2020-07-02 PROCEDURE — 74011000250 HC RX REV CODE- 250: Performed by: NURSE ANESTHETIST, CERTIFIED REGISTERED

## 2020-07-02 PROCEDURE — 82947 ASSAY GLUCOSE BLOOD QUANT: CPT

## 2020-07-02 PROCEDURE — 74011000250 HC RX REV CODE- 250: Performed by: OBSTETRICS & GYNECOLOGY

## 2020-07-02 PROCEDURE — 74011250636 HC RX REV CODE- 250/636: Performed by: ANESTHESIOLOGY

## 2020-07-02 PROCEDURE — 75410000003 HC RECOV DEL/VAG/CSECN EA 0.5 HR

## 2020-07-02 PROCEDURE — 75410000003 HC RECOV DEL/VAG/CSECN EA 0.5 HR: Performed by: OBSTETRICS & GYNECOLOGY

## 2020-07-02 PROCEDURE — 77030032490 HC SLV COMPR SCD KNE COVD -B: Performed by: OBSTETRICS & GYNECOLOGY

## 2020-07-02 PROCEDURE — 75410000000 HC DELIVERY VAGINAL/SINGLE

## 2020-07-02 RX ORDER — DOCUSATE SODIUM 100 MG/1
100 CAPSULE, LIQUID FILLED ORAL 2 TIMES DAILY
Status: DISCONTINUED | OUTPATIENT
Start: 2020-07-02 | End: 2020-07-04 | Stop reason: HOSPADM

## 2020-07-02 RX ORDER — ONDANSETRON 2 MG/ML
4 INJECTION INTRAMUSCULAR; INTRAVENOUS
Status: DISCONTINUED | OUTPATIENT
Start: 2020-07-02 | End: 2020-07-03 | Stop reason: SDUPTHER

## 2020-07-02 RX ORDER — SODIUM CHLORIDE, SODIUM LACTATE, POTASSIUM CHLORIDE, CALCIUM CHLORIDE 600; 310; 30; 20 MG/100ML; MG/100ML; MG/100ML; MG/100ML
75 INJECTION, SOLUTION INTRAVENOUS CONTINUOUS
Status: DISCONTINUED | OUTPATIENT
Start: 2020-07-02 | End: 2020-07-02

## 2020-07-02 RX ORDER — LIDOCAINE HYDROCHLORIDE AND EPINEPHRINE 20; 5 MG/ML; UG/ML
INJECTION, SOLUTION EPIDURAL; INFILTRATION; INTRACAUDAL; PERINEURAL AS NEEDED
Status: DISCONTINUED | OUTPATIENT
Start: 2020-07-02 | End: 2020-07-02 | Stop reason: HOSPADM

## 2020-07-02 RX ORDER — SODIUM CHLORIDE 0.9 % (FLUSH) 0.9 %
5-40 SYRINGE (ML) INJECTION AS NEEDED
Status: DISCONTINUED | OUTPATIENT
Start: 2020-07-02 | End: 2020-07-02

## 2020-07-02 RX ORDER — SODIUM BICARBONATE 1 MEQ/ML
SYRINGE (ML) INTRAVENOUS AS NEEDED
Status: DISCONTINUED | OUTPATIENT
Start: 2020-07-02 | End: 2020-07-02 | Stop reason: HOSPADM

## 2020-07-02 RX ORDER — TRISODIUM CITRATE DIHYDRATE AND CITRIC ACID MONOHYDRATE 500; 334 MG/5ML; MG/5ML
30 SOLUTION ORAL
Status: COMPLETED | OUTPATIENT
Start: 2020-07-02 | End: 2020-07-02

## 2020-07-02 RX ORDER — ONDANSETRON 2 MG/ML
4 INJECTION INTRAMUSCULAR; INTRAVENOUS AS NEEDED
Status: DISCONTINUED | OUTPATIENT
Start: 2020-07-02 | End: 2020-07-02

## 2020-07-02 RX ORDER — CEFAZOLIN SODIUM/WATER 2 G/20 ML
2 SYRINGE (ML) INTRAVENOUS ONCE
Status: COMPLETED | OUTPATIENT
Start: 2020-07-02 | End: 2020-07-02

## 2020-07-02 RX ORDER — OXYCODONE HYDROCHLORIDE 5 MG/1
10 TABLET ORAL
Status: DISCONTINUED | OUTPATIENT
Start: 2020-07-02 | End: 2020-07-02

## 2020-07-02 RX ORDER — OXYCODONE HYDROCHLORIDE 5 MG/1
10 TABLET ORAL
Status: DISCONTINUED | OUTPATIENT
Start: 2020-07-02 | End: 2020-07-03 | Stop reason: SDUPTHER

## 2020-07-02 RX ORDER — PROMETHAZINE HYDROCHLORIDE 25 MG/ML
INJECTION, SOLUTION INTRAMUSCULAR; INTRAVENOUS AS NEEDED
Status: DISCONTINUED | OUTPATIENT
Start: 2020-07-02 | End: 2020-07-02 | Stop reason: HOSPADM

## 2020-07-02 RX ORDER — MORPHINE SULFATE 0.5 MG/ML
INJECTION, SOLUTION EPIDURAL; INTRATHECAL; INTRAVENOUS AS NEEDED
Status: DISCONTINUED | OUTPATIENT
Start: 2020-07-02 | End: 2020-07-02 | Stop reason: HOSPADM

## 2020-07-02 RX ORDER — PHENYLEPHRINE HYDROCHLORIDE 10 MG/ML
INJECTION INTRAVENOUS AS NEEDED
Status: DISCONTINUED | OUTPATIENT
Start: 2020-07-02 | End: 2020-07-02 | Stop reason: HOSPADM

## 2020-07-02 RX ORDER — POLYETHYLENE GLYCOL 3350 17 G/17G
17 POWDER, FOR SOLUTION ORAL DAILY
Status: DISCONTINUED | OUTPATIENT
Start: 2020-07-02 | End: 2020-07-04 | Stop reason: HOSPADM

## 2020-07-02 RX ORDER — SODIUM CHLORIDE 0.9 % (FLUSH) 0.9 %
5-40 SYRINGE (ML) INJECTION EVERY 8 HOURS
Status: DISCONTINUED | OUTPATIENT
Start: 2020-07-02 | End: 2020-07-02

## 2020-07-02 RX ORDER — SODIUM CHLORIDE, SODIUM LACTATE, POTASSIUM CHLORIDE, CALCIUM CHLORIDE 600; 310; 30; 20 MG/100ML; MG/100ML; MG/100ML; MG/100ML
75 INJECTION, SOLUTION INTRAVENOUS CONTINUOUS
Status: DISCONTINUED | OUTPATIENT
Start: 2020-07-02 | End: 2020-07-04 | Stop reason: HOSPADM

## 2020-07-02 RX ORDER — MORPHINE SULFATE 10 MG/ML
5 INJECTION, SOLUTION INTRAMUSCULAR; INTRAVENOUS
Status: DISCONTINUED | OUTPATIENT
Start: 2020-07-02 | End: 2020-07-02

## 2020-07-02 RX ORDER — NALOXONE HYDROCHLORIDE 0.4 MG/ML
0.2 INJECTION, SOLUTION INTRAMUSCULAR; INTRAVENOUS; SUBCUTANEOUS
Status: DISCONTINUED | OUTPATIENT
Start: 2020-07-02 | End: 2020-07-03 | Stop reason: SDUPTHER

## 2020-07-02 RX ORDER — DIPHENHYDRAMINE HYDROCHLORIDE 50 MG/ML
12.5 INJECTION, SOLUTION INTRAMUSCULAR; INTRAVENOUS
Status: DISCONTINUED | OUTPATIENT
Start: 2020-07-02 | End: 2020-07-02

## 2020-07-02 RX ORDER — KETOROLAC TROMETHAMINE 30 MG/ML
30 INJECTION, SOLUTION INTRAMUSCULAR; INTRAVENOUS
Status: DISCONTINUED | OUTPATIENT
Start: 2020-07-02 | End: 2020-07-02

## 2020-07-02 RX ORDER — SODIUM CHLORIDE, SODIUM LACTATE, POTASSIUM CHLORIDE, CALCIUM CHLORIDE 600; 310; 30; 20 MG/100ML; MG/100ML; MG/100ML; MG/100ML
INJECTION, SOLUTION INTRAVENOUS
Status: DISCONTINUED | OUTPATIENT
Start: 2020-07-02 | End: 2020-07-02 | Stop reason: HOSPADM

## 2020-07-02 RX ORDER — NALOXONE HYDROCHLORIDE 0.4 MG/ML
0.1 INJECTION, SOLUTION INTRAMUSCULAR; INTRAVENOUS; SUBCUTANEOUS
Status: DISCONTINUED | OUTPATIENT
Start: 2020-07-02 | End: 2020-07-02

## 2020-07-02 RX ORDER — DIPHENHYDRAMINE HYDROCHLORIDE 50 MG/ML
12.5 INJECTION, SOLUTION INTRAMUSCULAR; INTRAVENOUS
Status: DISCONTINUED | OUTPATIENT
Start: 2020-07-02 | End: 2020-07-03 | Stop reason: SDUPTHER

## 2020-07-02 RX ORDER — KETOROLAC TROMETHAMINE 30 MG/ML
30 INJECTION, SOLUTION INTRAMUSCULAR; INTRAVENOUS
Status: DISCONTINUED | OUTPATIENT
Start: 2020-07-02 | End: 2020-07-03 | Stop reason: SDUPTHER

## 2020-07-02 RX ORDER — MORPHINE SULFATE 10 MG/ML
5 INJECTION, SOLUTION INTRAMUSCULAR; INTRAVENOUS
Status: DISCONTINUED | OUTPATIENT
Start: 2020-07-02 | End: 2020-07-03 | Stop reason: SDUPTHER

## 2020-07-02 RX ADMIN — LIDOCAINE HYDROCHLORIDE,EPINEPHRINE BITARTRATE 10 ML: 20; .005 INJECTION, SOLUTION EPIDURAL; INFILTRATION; INTRACAUDAL; PERINEURAL at 00:39

## 2020-07-02 RX ADMIN — ONDANSETRON 4 MG: 2 INJECTION INTRAMUSCULAR; INTRAVENOUS at 00:27

## 2020-07-02 RX ADMIN — KETOROLAC TROMETHAMINE 30 MG: 30 INJECTION, SOLUTION INTRAMUSCULAR at 15:30

## 2020-07-02 RX ADMIN — KETOROLAC TROMETHAMINE 30 MG: 30 INJECTION, SOLUTION INTRAMUSCULAR at 21:18

## 2020-07-02 RX ADMIN — SODIUM CHLORIDE, SODIUM LACTATE, POTASSIUM CHLORIDE, AND CALCIUM CHLORIDE: 600; 310; 30; 20 INJECTION, SOLUTION INTRAVENOUS at 00:39

## 2020-07-02 RX ADMIN — MORPHINE SULFATE 4 MG: 0.5 INJECTION, SOLUTION EPIDURAL; INTRATHECAL; INTRAVENOUS at 00:59

## 2020-07-02 RX ADMIN — AZITHROMYCIN MONOHYDRATE 500 MG: 500 INJECTION, POWDER, LYOPHILIZED, FOR SOLUTION INTRAVENOUS at 00:28

## 2020-07-02 RX ADMIN — PHENYLEPHRINE HYDROCHLORIDE 80 MCG: 10 INJECTION INTRAVENOUS at 01:23

## 2020-07-02 RX ADMIN — KETOROLAC TROMETHAMINE 30 MG: 30 INJECTION, SOLUTION INTRAMUSCULAR at 08:53

## 2020-07-02 RX ADMIN — SODIUM CITRATE AND CITRIC ACID MONOHYDRATE 30 ML: 500; 334 SOLUTION ORAL at 00:27

## 2020-07-02 RX ADMIN — CEFAZOLIN SODIUM 2 G: 100 INJECTION, POWDER, LYOPHILIZED, FOR SOLUTION INTRAVENOUS at 00:27

## 2020-07-02 RX ADMIN — AZITHROMYCIN MONOHYDRATE 500 MG: 500 INJECTION, POWDER, LYOPHILIZED, FOR SOLUTION INTRAVENOUS at 00:39

## 2020-07-02 RX ADMIN — SODIUM BICARBONATE 1 MEQ: 84 INJECTION, SOLUTION INTRAVENOUS at 00:39

## 2020-07-02 RX ADMIN — DOCUSATE SODIUM 100 MG: 100 CAPSULE, LIQUID FILLED ORAL at 08:53

## 2020-07-02 RX ADMIN — PROMETHAZINE HYDROCHLORIDE 6.25 MG: 25 INJECTION INTRAMUSCULAR; INTRAVENOUS at 00:56

## 2020-07-02 RX ADMIN — DOCUSATE SODIUM 100 MG: 100 CAPSULE, LIQUID FILLED ORAL at 17:21

## 2020-07-02 NOTE — ANESTHESIA POSTPROCEDURE EVALUATION
Procedure(s):   SECTION. epidural    Anesthesia Post Evaluation      Multimodal analgesia: multimodal analgesia used between 6 hours prior to anesthesia start to PACU discharge  Patient location during evaluation: floor  Patient participation: complete - patient participated  Level of consciousness: awake  Pain management: adequate  Airway patency: patent  Anesthetic complications: no  Cardiovascular status: acceptable  Respiratory status: acceptable  Hydration status: acceptable  Post anesthesia nausea and vomiting:  none  Final Post Anesthesia Temperature Assessment:  Normothermia (36.0-37.5 degrees C)      INITIAL Post-op Vital signs: No vitals data found for the desired time range.

## 2020-07-02 NOTE — PROGRESS NOTES
SBAR OUT Report: Mother    Verbal report given to SURYA Hull RN (full name & credentials) on this patient, who is now being transferred to MIU (unit) for routine progression of care. The patient is wearing a green \"Anesthesia-Duramorph\" band. Report consisted of patient's Situation, Background, Assessment and Recommendations (SBAR). [place] ID bands were compared with the identification form, and verified with the patient and receiving nurse. Information from the SBAR, OR Summary, Procedure Summary, Intake/Output and MAR and the [place] Report was reviewed with the receiving nurse; opportunity for questions and clarification provided.

## 2020-07-02 NOTE — PROGRESS NOTES
SBAR IN Report: Mother    Verbal report received from CIERA Alberto RN (full name & credentials) on this patient, who is now being transferred from L&D (unit) for routine post - op. The patient is wearing a green \"Anesthesia-Duramorph\" band. Report consisted of patient's Situation, Background, Assessment and Recommendations (SBAR).  ID bands were compared with the identification form, and verified with the patient and transferring nurse. Information from the SBAR, Intake/Output, MAR and Recent Results and the [place] Report was reviewed with the transferring nurse; opportunity for questions and clarification provided.

## 2020-07-02 NOTE — PROGRESS NOTES
Report received from Stevie Mari RN care assumed. Pt sitting up in bed with call light in reach. No complaints at this time. Pt has approximately 600ml of urine in caldera bag. No s/s of distress noted. Call light within reach.

## 2020-07-02 NOTE — LACTATION NOTE

## 2020-07-02 NOTE — PROGRESS NOTES
Dr. Kota uV at [facility], MD states she will place orders for repeat CBC in the morning to follow up on WBC (19.5).

## 2020-07-02 NOTE — PROGRESS NOTES
Shift assessment complete see flowsheet. Discussed today plan of care with pt. Pt voiced understanding. Bleeding precautions given. Pt instructed not to attempt to get OOB without assistance from RN. Pt noted to have a flat red rash to abdomen and left side, pt states she had PUPPS but it has improved since delivery. Questions encouraged and answered. Pt to call with needs/concerns. Pt in bed eating with call light in reach. No s/s of distress noted.

## 2020-07-02 NOTE — LACTATION NOTE
This note was copied from a baby's chart. Assisted with attempt at breast in football and cross cradle on R. No latch. Mom states baby has yet to latch on R, but does well on L. No noticeable difference in L and R. Assisted with breastfeeding in cradle on L. Baby fed well, latched easily. Demonstrated manual lip flange. Encouraged frequent feeding and watch output. Discussed normal  behavior. May take baby a little while to figure out how to nurse well. Plan to continued trying at breast and pumping if no latch on L. Will follow output and weight loss. Will continue to assist with positioning and technique. Encouraged try at breast consider pumping if still not taking R tonight. Reviewed first 24 hour expectations. Discussed feeding expectations in second day. Encouraged to try at breast.  Encouraged skin to skin. Reviewed Breastfeeding Packet.

## 2020-07-02 NOTE — PROGRESS NOTES
Day of Delivery Progress Note    Patient doing well DOD from  delivery without significant complaints. Pain controlled on current medication. Voiding without difficulty, normal lochia. Patient Vitals for the past 24 hrs:   Temp Pulse Resp BP SpO2   20 1456 98.3 °F (36.8 °C) 91 16 100/56 98 %   20 1127 98.4 °F (36.9 °C) 77 16 110/61 98 %   20 0714 98.4 °F (36.9 °C) 81 16 111/58 98 %   20 0540  87 16 112/57 95 %   20 0440 98.5 °F (36.9 °C) 80 16 106/64 97 %   20 0340  80 16 97/74 98 %   20 0315  82 16 111/56 98 %   20 0245  85 16 104/59 98 %   20 0230  84 16 101/56 97 %   20 0215  89 16 109/53 97 %   20 0200  88 16 101/52 98 %   20 0150  92 16 121/59 99 %   20 0145  (!) 102 16 110/60 98 %   20 0137 98.6 °F (37 °C) 97 15 110/75 99 %   20 0030  (!) 102  106/68    20 2345  (!) 104  112/54    20 2330  98  111/55    20 2214  93  100/65    20 2200  90  109/65    20 2145  85  105/66    20 2114  (!) 118  102/65    20 2045  (!) 110  111/64        Exam:  Patient without distress. Abdomen soft, fundus firm at level of umbilicus, non tender. Incision dry and clean without erythema. Lower extremities are negative for swelling, cords or tenderness.         Recent Labs     20  1303 20  1935 20  0852 06/10/20  0859 20  1025 20  1042 20  1122 19  1124   WBC 19.5* 17.4* 11.4* 15.4* 12.7* 13.1* 14.2* 11.3*   HGB 9.3* 10.5* 9.6* 9.5* 9.4* 9.6* 9.9* 12.7   HCT 29.3* 32.6* 29.6* 30.4*  30.5* 28.7* 29.3* 29.8* 38.5    253 311 269 253 270 213 239       Recent Labs     20  1935 20  0852 20  0842 06/10/20  0859 20  1042   *  --   --  136  --    K 3.4*  --   --  3.6  --      --   --  105  --    CO2 17*  --   --  21  --    AGAP 11  --   --  10 --    *  --   --  156*  --    BUN 7  --   --  4*  --    CREA 0.76 0.58 0.57 0.70 0.50*   GFRAA >60 152 153 >60 160   GFRNA >60 132 133 >60 139   CA 8.4  --   --  8.6  --    PHOS  --   --   --  3.5  --    ALB 2.9* 3.4*  --  2.8* 3.5*   TP 6.6 5.8*  --  6.8 5.9*   GLOB 3.7*  --   --  4.0*  --    AGRAT 0.8*  --   --  0.7*  --    AST 31 28  --  19 20   ALT 34 22  --  25 14       Recent Labs     06/17/20  0852 06/10/20  0859 05/28/20  1042   URICA 3.0 3.0 2.6    190 181        Recent Labs     07/02/20  0109 07/02/20  0106 06/30/20  1935 06/10/20  0859   GLU  --   --  136* 156*   GLUCPOC 82 64*  --   --        Problem List  Date Reviewed: 7/1/2020          Codes Class Noted    39 weeks gestation of pregnancy ICD-10-CM: Z3A.39  ICD-9-CM: V22.2  6/30/2020        Encounter for induction of labor ICD-10-CM: Z34.90  ICD-9-CM: V22.1  6/30/2020        * (Principal) Proteinuria affecting pregnancy in third trimester ICD-10-CM: O12.13  ICD-9-CM: 646.23, 791.0  6/17/2020    Overview Addendum 6/19/2020  8:57 AM by Mariusz Lyles MD     - 2+ on OBV, PCR was > 2000. Concurrently dx'd with UTI, started treatment a few days later.   - 6/17/2020 24h urine __= 330mg/24h_______________   - PIH labs = WNL             Pelvic pain in antepartum period in third trimester ICD-10-CM: O26.893, R10.2  ICD-9-CM: 646.83, 625.9  6/15/2020        Vaginal discharge during pregnancy in third trimester ICD-10-CM: O26.893, N89.8  ICD-9-CM: 646.83, 623.5  6/15/2020        LUCY (iron deficiency anemia) ICD-10-CM: D50.9  ICD-9-CM: 280.9  6/11/2020        Anemia affecting pregnancy in third trimester ICD-10-CM: O99.013  ICD-9-CM: 648.23, 285.9  4/15/2020    Overview Addendum 6/11/2020 10:26 AM by Mariusz Lyles MD     - Hgb 9.9 @ 28wks, - fe started 4/15/2020 28w2d  - recheck 28- 34w4d down to 9.6, referred to H/O             Normal pregnancy, first ICD-10-CM: Z34.00  ICD-9-CM: V22.0  2/7/2020        Marginal insertion of umbilical cord affecting management of mother ICD-10-CM: Q79.020  ICD-9-CM: 656.73  2020    Overview Addendum 2020  9:14 AM by Reynold Roque MD     - growth @ 32-34 weeks = 2020 32w0d -- 3lb 8 oz (24%), AC=21%. AF=21  - repeat 36 weeks 6-9 (53%), but AC>99%. AF=18   - POC glucose = WNL   - Rec avoid all added sugars, limit carbs. Risks of SD discussed including fetal harm and/or death  - repeat 39 weeks __________________                     Current Facility-Administered Medications   Medication Dose Route Frequency    docusate sodium (COLACE) capsule 100 mg  100 mg Oral BID    polyethylene glycol (MIRALAX) packet 17 g  17 g Oral DAILY    lactated Ringers infusion  75 mL/hr IntraVENous CONTINUOUS    ketorolac (TORADOL) injection 30 mg  30 mg IntraVENous Q6H PRN    oxyCODONE IR (ROXICODONE) tablet 10 mg  10 mg Oral Q6H PRN    morphine 10 mg/ml injection 5 mg  5 mg IntraVENous Q1H PRN    naloxone (NARCAN) injection 0.2 mg  0.2 mg IntraVENous ONCE PRN    ondansetron (ZOFRAN) injection 4 mg  4 mg IntraVENous Q6H PRN    diphenhydrAMINE (BENADRYL) injection 12.5 mg  12.5 mg IntraVENous Q6H PRN       OB History    Para Term  AB Living   1 1 1 0 0 1   SAB TAB Ectopic Molar Multiple Live Births   0 0 0 0 0 1      # Outcome Date GA Lbr Angelito/2nd Weight Sex Delivery Anes PTL Lv   1 Term 20 39w3d  7 lb 11.8 oz (3.51 kg) F CS-LTranv EPI N ELOISA      Complications: Failure to Progress in First Stage      Name: Zuhair Lighter: 8  Apgar5: 9       Assessment and Plan:    POD 1:  Patient appears to be having uncomplicated post- course. Continue routine post-op care and maternal education. Breastfeeding strategies reviewed. SIDs  precautions reviewed    Pt strongly encouraged to walk, goal:  Walk the entire hallway every 1-3x today. Elevated WBC w/ left shift, afeb. Pt states she is feeling good. Does not report sore throat, cough, myalgias.     States she does NOT have abd'l/incisional pain. Repeat labs in am.  Pt encouraged to walk the entire hallway. Watch temp. ABO Group   Date Value Ref Range Status   12/03/2019 O  Final     Rh (D)   Date Value Ref Range Status   12/03/2019 Positive  Final     Comment:     Please note: Prior records for this patient's ABO / Rh type are not  available for additional verification.        Rubella Ab, IgG   Date Value Ref Range Status   12/03/2019 3.70 Immune >0.99 index Final     Comment:                                     Non-immune       <0.90                                  Equivocal  0.90 - 0.99                                  Immune           >0.99

## 2020-07-02 NOTE — L&D DELIVERY NOTE
Delivery Summary    Patient: Pablo Cardenas MRN: [id number]  SSN: [id number]    YOB: 2000  Age: 22 y.o. Sex: female        Information for the patient's :  Cruzito Schulz [[id number]]       Labor Events:    Labor: No    Steroids: None   Cervical Ripening Date/Time: 2020 8:05 PM   Cervical Ripening Type: Misoprostol   Antibiotics During Labor: No   Rupture Identifier:      Rupture Date/Time: 2020 3:49 PM   Rupture Type: SROM   Amniotic Fluid Volume:  Moderate    Amniotic Fluid Description: Clear    Amniotic Fluid Odor:      Induction: Kc Bulb (balloon)       Induction Date/Time:        Indications for Induction: Other(comment)    Augmentation: Oxytocin   Augmentation Date/Time: 20202:17 PM   Indications for Augmentation: Ineffective Contraction Pattern   Labor complications: Failure to Progress in First Stage       Additional complications:        Delivery Events:  Indications For Episiotomy:     Episiotomy:     Perineal Laceration(s): None   Repaired:     Periurethral Laceration Location:      Repaired:     Labial Laceration Location:     Repaired:     Sulcal Laceration Location:     Repaired:     Vaginal Laceration Location:     Repaired:     Cervical Laceration Location:     Repaired:     Repair Suture: None   Number of Repair Packets: 0   Estimated Blood Loss (ml):  ml   Quantitaive Blood Loss (ml):             Delivery Date: 2020    Delivery Time: 12:56 AM   Delivery Type: , Low Transverse     Details    Trial of Labor: Yes   Primary/Repeat: Primary   Priority: Routine   Indications:  Failure to Progress;Arrest of Descent       Sex:  Female     Gestational Age: 38w3d  Delivery Clinician:  Fabby Guillory  Living Status: Living   Delivery Location: OR            APGARS  One minute Five minutes Ten minutes   Skin color: 0   1        Heart rate: 2   2        Grimace: 2   2        Muscle tone: 2   2        Breathin   2 Totals: 8   9          Presentation: Vertex    Position:        Resuscitation Method:  Suctioning-bulb; Tactile Stimulation     Meconium Stained: None      Cord Information: 3 Vessels  Complications: None  Cord around:    Delayed cord clamping? Yes  Cord clamped date/time:2020 12:57 AM  Disposition of Cord Blood: Lab    Blood Gases Sent?: Yes    Placenta:  Date/Time: 2020 12:59 AM  Removal: Spontaneous      Appearance: Normal      Measurements:  Birth Weight: 7 lb 11.8 oz (3.51 kg)      Birth Length: 49.5 cm      Head Circumference: 33 cm      Chest Circumference: 35 cm     Abdominal Girth:       Other Providers:   TERENCE ESCOBAR;RAMSEY GODINEZ;BARBIE PENNINGTON;DAVINA NICHOLSON;DAVINA CALHOUN;MOISE PARRY;, Obstetrician;Primary Nurse;Primary [place] Nurse;Scrub Tech;Neonatologist;Anesthesiologist             Group B Strep: No results found for: Thermon Leavens, GRBSEXT  Information for the patient's :  Cruzito Schulz [[id number]]   No results found for: ABORH, PCTABR, PCTDIG, BILI, ABORHEXT, ABORH    Recent Labs     20  0109 20  0106   HCO3I 18.2* 23.0   SO2I 32* 9*   IBD 7 5   SPECTI VENOUS CORD ARTERIAL CORD   ISITE CORD CORD   IDEV ROOM AIR ROOM AIR   IALLEN NOT APPLICABLE NOT APPLICABLE

## 2020-07-02 NOTE — OP NOTES
Section Delivery Operative Report       Patient: Shania Guerrero MRN: [id number]  SSN: [id number]    YOB: 2000  Age: 22 y.o. Sex: female       Date of Procedure: 2020     Preoperative Diagnosis:   18yo G1 at 39w3d with failure to progress in active labor -- no cervical change in 4 hours. Postoperative Diagnosis:  DONNIE     Procedure:  Primary LTCS    Surgeon(s):  Wendie Halsted, MD    Indication: as above     Anesthesia:  epidural     Prophylactic Antibiotics:   Ancef, Azith    QBL per RN    Information for the patient's :  Michele Leahy [[id number]]   Delivery of a 7 lb 11.8 oz (3.51 kg) female infant on 2020 at 12:56 AM. Apgars were 8  and 9 . Umbilical Cord: 3 Vessels     Umbilical Cord Events: None     Placenta: Spontaneous removal with Normal appearance. Amniotic Fluid Volume: Moderate     Amniotic Fluid Description:  Clear       Specimens: *none            Complications:  none    Findings: VFI at 0056 on 2020; APGARs 8, 9. Normal appearing uterus, tubes, ovaries       Procedure Details:    Patient was taken to the OR after informed consent had been obtained. After anesthesia was found to be adequate, she was then placed in a dorsal supine position with a left lateral tilt. She was then prepped and draped in a sterile fashion. Time out was completed. Attention was turned to the abdomen and an Allis test confirmed adequate anesthesia. A Pfannenstiel skin incision was made with the scalpel down to the fascia. The fascia was knicked in the midline. The incision was extended laterally using padilla scissors. The cephalad fascia was grasped with kochers and the rectus muscles  off both bluntly and sharply. The process was repeated on the inferior fascia. Rectus muscles were  bluntly. The peritoneum was entered using Metzenbaum scissors and the incision was extended laterally bluntly. The bladder blade was inserted.  A low transverse  semi lunar transverse incision was made on the uterus in the midline. The uterine incision was extended laterally bluntly. The fetus was delivered from a vertex  position through the uterine incision. Naso and oropharynx were bulb suctioned. The cord was doubly clamped and transected. The infant was handed off to the awaiting RNs. The placenta was removed by fundal massage and traction on the cord. The uterus was cleaned with a lap pad. The uterine incision was closed with 1 Chromic suture in a running interlocking fashion. The second layer was imbricated over the first layer  with 1 Chromic suture in a running non-locking  fashion. The posterior cul-de-sac was irrigated with warm normal saline. Uterus was replaced into the abdomen. Gutters were irrigated as well. The peritoneum was closed with Chromic . The fascia was closed in a running non interlocking fashion using #0 Vicryl suture. The subcuticular layers were reapproximated with 2-0 plain gut in running fashion. The skin was closed with a 4-0 Vicryl in a subcuticular fashion. The patient tolerated the procedure well. Sponge, lap, and needle counts were correct times three and the patient and baby were taken to recovery/postpartum room in stable condition.       Signed By: Beth Wood MD     July 2, 2020

## 2020-07-02 NOTE — PROGRESS NOTES
Pt and visitor educated on keeping track of I&O's, sheet given to pt. Pt voiced understanding. Pt in bed with call light in reach.  No s/s of distress noted

## 2020-07-02 NOTE — PROGRESS NOTES
SVE:  Cervical edema --now only 7/90/-1     No improvement in cervical change in >4hrs in active labor w/ pit 26. Recommend c/s at this point. C/S Counseling:  Pt counseled on R/B/A to c/s including, but not limited to, bleeding, infxn, damage to nearby structures (most commonly bladder, ureters, bowel), possible cut to the baby, possble need for blood transfusion with associated risk of possible transmission of Hep C/HIV, possible need for life saving C-hyst.  All questions answered. Pt desires to proceed with c/s.            Cherise Mathur MD

## 2020-07-02 NOTE — PROGRESS NOTES
Dr Malvina Schilder in room, sve done 8-9 cm and 0/-1 station. Pt repositioned to [facility].

## 2020-07-02 NOTE — PROGRESS NOTES
SCD removed, IV capped. Pt up and ambulated to bathroom with assistance from this RN and Lilibeth Joel RN. Pt able to void a scant amount, no clots noted in toilet. Heaven care taught and pt performed. Heaven pad changed (small amount of blood on old pad). Pt educated on how to measure output. Gown changed. Pt ambulated back to bed with minimal assistance, pt tolerated well. Pt back in bed with call light in reach.

## 2020-07-03 LAB
BASOPHILS # BLD: 0.1 K/UL (ref 0–0.2)
BASOPHILS NFR BLD: 0 % (ref 0–2)
DIFFERENTIAL METHOD BLD: ABNORMAL
EOSINOPHIL # BLD: 0.2 K/UL (ref 0–0.8)
EOSINOPHIL NFR BLD: 1 % (ref 0.5–7.8)
ERYTHROCYTE [DISTWIDTH] IN BLOOD BY AUTOMATED COUNT: 21.2 % (ref 11.9–14.6)
HCT VFR BLD AUTO: 29 % (ref 35.8–46.3)
HGB BLD-MCNC: 9.1 G/DL (ref 11.7–15.4)
IMM GRANULOCYTES # BLD AUTO: 0.3 K/UL (ref 0–0.5)
IMM GRANULOCYTES NFR BLD AUTO: 2 % (ref 0–5)
LYMPHOCYTES # BLD: 2.6 K/UL (ref 0.5–4.6)
LYMPHOCYTES NFR BLD: 15 % (ref 13–44)
MCH RBC QN AUTO: 28.9 PG (ref 26.1–32.9)
MCHC RBC AUTO-ENTMCNC: 31.4 G/DL (ref 31.4–35)
MCV RBC AUTO: 92.1 FL (ref 79.6–97.8)
MONOCYTES # BLD: 1.2 K/UL (ref 0.1–1.3)
MONOCYTES NFR BLD: 7 % (ref 4–12)
NEUTS SEG # BLD: 13.5 K/UL (ref 1.7–8.2)
NEUTS SEG NFR BLD: 76 % (ref 43–78)
NRBC # BLD: 0 K/UL (ref 0–0.2)
PLATELET # BLD AUTO: 201 K/UL (ref 150–450)
PMV BLD AUTO: 10.4 FL (ref 9.4–12.3)
RBC # BLD AUTO: 3.15 M/UL (ref 4.05–5.2)
WBC # BLD AUTO: 17.8 K/UL (ref 4.3–11.1)

## 2020-07-03 PROCEDURE — 65270000029 HC RM PRIVATE

## 2020-07-03 PROCEDURE — 74011250637 HC RX REV CODE- 250/637: Performed by: OBSTETRICS & GYNECOLOGY

## 2020-07-03 PROCEDURE — 85025 COMPLETE CBC W/AUTO DIFF WBC: CPT

## 2020-07-03 PROCEDURE — 74011250637 HC RX REV CODE- 250/637: Performed by: ANESTHESIOLOGY

## 2020-07-03 PROCEDURE — 36415 COLL VENOUS BLD VENIPUNCTURE: CPT

## 2020-07-03 RX ORDER — SODIUM CHLORIDE 0.9 % (FLUSH) 0.9 %
5-40 SYRINGE (ML) INJECTION AS NEEDED
Status: DISCONTINUED | OUTPATIENT
Start: 2020-07-03 | End: 2020-07-04 | Stop reason: HOSPADM

## 2020-07-03 RX ORDER — SODIUM CHLORIDE 0.9 % (FLUSH) 0.9 %
5-40 SYRINGE (ML) INJECTION EVERY 8 HOURS
Status: DISCONTINUED | OUTPATIENT
Start: 2020-07-03 | End: 2020-07-03 | Stop reason: SDUPTHER

## 2020-07-03 RX ORDER — NALOXONE HYDROCHLORIDE 0.4 MG/ML
0.4 INJECTION, SOLUTION INTRAMUSCULAR; INTRAVENOUS; SUBCUTANEOUS AS NEEDED
Status: DISCONTINUED | OUTPATIENT
Start: 2020-07-03 | End: 2020-07-04 | Stop reason: HOSPADM

## 2020-07-03 RX ORDER — ONDANSETRON 2 MG/ML
4 INJECTION INTRAMUSCULAR; INTRAVENOUS
Status: DISCONTINUED | OUTPATIENT
Start: 2020-07-03 | End: 2020-07-04 | Stop reason: HOSPADM

## 2020-07-03 RX ORDER — IBUPROFEN 800 MG/1
800 TABLET ORAL
Status: DISCONTINUED | OUTPATIENT
Start: 2020-07-03 | End: 2020-07-04 | Stop reason: HOSPADM

## 2020-07-03 RX ORDER — HYDROCODONE BITARTRATE AND ACETAMINOPHEN 7.5; 325 MG/1; MG/1
1 TABLET ORAL
Status: DISCONTINUED | OUTPATIENT
Start: 2020-07-03 | End: 2020-07-04 | Stop reason: HOSPADM

## 2020-07-03 RX ORDER — SODIUM CHLORIDE 0.9 % (FLUSH) 0.9 %
5-40 SYRINGE (ML) INJECTION AS NEEDED
Status: DISCONTINUED | OUTPATIENT
Start: 2020-07-03 | End: 2020-07-03 | Stop reason: SDUPTHER

## 2020-07-03 RX ORDER — DIPHENHYDRAMINE HYDROCHLORIDE 50 MG/ML
12.5 INJECTION, SOLUTION INTRAMUSCULAR; INTRAVENOUS
Status: DISCONTINUED | OUTPATIENT
Start: 2020-07-03 | End: 2020-07-04 | Stop reason: HOSPADM

## 2020-07-03 RX ORDER — SODIUM CHLORIDE 0.9 % (FLUSH) 0.9 %
5-40 SYRINGE (ML) INJECTION EVERY 8 HOURS
Status: DISCONTINUED | OUTPATIENT
Start: 2020-07-03 | End: 2020-07-04 | Stop reason: HOSPADM

## 2020-07-03 RX ORDER — HYDROCODONE BITARTRATE AND ACETAMINOPHEN 10; 325 MG/1; MG/1
1 TABLET ORAL
Status: DISCONTINUED | OUTPATIENT
Start: 2020-07-03 | End: 2020-07-04 | Stop reason: HOSPADM

## 2020-07-03 RX ADMIN — HYDROCODONE BITARTRATE AND ACETAMINOPHEN 1 TABLET: 10; 325 TABLET ORAL at 13:41

## 2020-07-03 RX ADMIN — OXYCODONE 10 MG: 5 TABLET ORAL at 02:11

## 2020-07-03 RX ADMIN — IBUPROFEN 800 MG: 800 TABLET, FILM COATED ORAL at 13:41

## 2020-07-03 RX ADMIN — HYDROCODONE BITARTRATE AND ACETAMINOPHEN 1 TABLET: 10; 325 TABLET ORAL at 07:23

## 2020-07-03 RX ADMIN — IBUPROFEN 800 MG: 800 TABLET, FILM COATED ORAL at 20:03

## 2020-07-03 RX ADMIN — DOCUSATE SODIUM 100 MG: 100 CAPSULE, LIQUID FILLED ORAL at 20:03

## 2020-07-03 RX ADMIN — IBUPROFEN 800 MG: 800 TABLET, FILM COATED ORAL at 04:22

## 2020-07-03 RX ADMIN — DOCUSATE SODIUM 100 MG: 100 CAPSULE, LIQUID FILLED ORAL at 07:24

## 2020-07-03 NOTE — LACTATION NOTE
This note was copied from a baby's chart. Individualized Feeding Plan for Breastfeeding   Lactation Services [phone]      As much as possible, hold your baby on your chest so babys bare skin is against your bare skin with a blanket covering babys back, especially 30 minutes before it is time for baby to eat. Watch for early feeding cues such as, licking lips, sucking motions, rooting, hands to mouth. Crying is a late feeding cue. Feed your baby at least 8 times in 24 hours, or more if your baby is showing feeding cues. If baby is sleepy put baby skin to skin and watch for hunger cues. To rouse baby: unwrap, undress, massage hands, feet, & back, change diaper, gently change babys position from lying to sitting. 15-20 minutes on the first breast of active breastfeeding is considered a good feeding. Good, active breastfeeding is when baby is alert, tugging the nipple, their ear may move, and you can hear swallows. Allow baby to finish the first side before changing sides. Sleeping at the breast or only brief, light sucks should not be considered a good, full breastfeed. At each feeding:  __x__1. Do Suck Practice on finger before each feeding until sucking pattern is smooth. Try using index finger. Nail down towards tongue. __x__2. Hand Express for a few minutes prior to latching to help start milk flow. __x__3. Baby needs to NURSE WELL x 15-20 minutes on at least first breast, burp and offer 2nd breast at every feeding. If no sustained latch only attempt at breast for 10 minutes. If baby does not latch on and feed well on at least one side, you should:   __x__4. Double pump for 15 minutes with breast massage and compression. Hand express for an additional 2-3 minutes per side. Pump after each feeding attempt or poor feeding, up to 8 times per day. If you are not putting baby to the breast you need to pump 8 times a day. Pump every 3 hours. __x__5.  Give baby all of the breast milk you obtain using a straight syringe or  curved syringe. If baby does NOT have enough wet and dirty diapers per day, is jaundiced/lethargic, or has significant weight loss AND you do NOT pump enough milk for each feeding (per volume listed below), formula supplementation may need to be used. Call lactation department /pediatrician if you have concerns. AVERAGE INTAKES OF COLOSTRUM BY HEALTHY  INFANTS:  Time  Day Intake (ml per feeding)  Based on 8 feedings per day. 1st 24 hrs  1 2-10 ml  24-48 hrs  2 5-15 ml  48-72 hrs  3 15-30 ml (0.5-1 oz)  72-96 hrs  4 30-45 ml (1-1.5oz)                          5-6      45-60 ml (1.5-2oz)                           7          75 ml    By day 7, baby will need 75 ml or 2.5 oz at each feeding based on 8 feedings per day & babys weight. (1oz = 30ml). Total milk volume needed in 24 hours by Day 7 is 19-21 oz per day based on baby's birthweight of 7-12. The more often baby eats, the less volume they need per feeding. If baby is eating more often than the minimum of 8 times per day, they may take less per feeding. Comments: If baby does not latch to the R, pump. Pump at least every other feeding if not latching. Can pump both sides. If giving formula, will need to pump. If pumping, suggest using olive oil or coconut oil on your nipples before pumping to help reduce the friction. Use feeding plan until follow up with pediatrician. Continue to attempt at the breast for most feeds. Pump every 3 hours if no latch. Give all pumped colostrum/breastmilk at each feeding. OUTPATIENT APPOINTMENT Suggested. Outpatient services are located on the 4th floor at [facility]. Check in at the 4th floor registration desk (the same one you used when you came to have your baby). Call for questions [phone]     Motif Marylu:  Power on and press wavy line button to go into let-down mode. Cycle will be 60 (and cannot be changed).   Adjust level to comfort. After 2 minutes, press wavy line button again to go into expression mode. Cycle should be set to 46. Again, adjust level to comfort. Cycle indicates the number of times per minute the pump is pulling. Majority of time should be in slower Expression Mode.

## 2020-07-03 NOTE — PROGRESS NOTES
Anesthesiology  Post-op Note    Post-op day 1 s/p  via epidural, spinal with neuraxial opioids for post-op pain management. Visit Vitals  /61 (BP 1 Location: Left arm, BP Patient Position: At rest)   Pulse 74   Temp 36.7 °C (98.1 °F)   Resp 16   Ht 5' 2\" (1.575 m)   LMP 2019   SpO2 97%   Breastfeeding Yes   BMI 25.97 kg/m²     Airway patent, patient appropriately hydrated and appears euvolemic. Patient is Alert and oriented. Pain is well controlled. Pruritus is well controlled. Nausea is well controlled. No complaints about back or site of injection. Motor and sensory function has returned to baseline in lower extremities. Patient is satisfied with anesthetic and reports no complications. Continue current orders, then initiate surgeon's orders for pain management 24 hours after . Follow up per surgeon.

## 2020-07-03 NOTE — LACTATION NOTE
This note was copied from a baby's chart. Mom reports baby has taken R side, but is very inconsistent. Still doing L well. Pumped once. Suggested mom continue to pump on R if no latch. Suggested sliding baby from L to R side in an attempt to maintain same positioning. See progress notes for feeding plan. Paper copy given to mom. Offered to assist with feeding, mom declined. Will follow.

## 2020-07-04 VITALS
HEART RATE: 87 BPM | DIASTOLIC BLOOD PRESSURE: 69 MMHG | OXYGEN SATURATION: 98 % | HEIGHT: 62 IN | BODY MASS INDEX: 25.97 KG/M2 | TEMPERATURE: 98 F | SYSTOLIC BLOOD PRESSURE: 107 MMHG | RESPIRATION RATE: 16 BRPM

## 2020-07-04 PROCEDURE — 74011250637 HC RX REV CODE- 250/637: Performed by: OBSTETRICS & GYNECOLOGY

## 2020-07-04 RX ORDER — HYDROCODONE BITARTRATE AND ACETAMINOPHEN 5; 325 MG/1; MG/1
1 TABLET ORAL
Qty: 30 TAB | Refills: 0 | Status: SHIPPED | OUTPATIENT
Start: 2020-07-04 | End: 2020-07-09

## 2020-07-04 RX ORDER — IBUPROFEN 800 MG/1
800 TABLET ORAL
Qty: 60 TAB | Refills: 2 | Status: SHIPPED | OUTPATIENT
Start: 2020-07-04 | End: 2020-08-13

## 2020-07-04 RX ADMIN — DOCUSATE SODIUM 100 MG: 100 CAPSULE, LIQUID FILLED ORAL at 08:50

## 2020-07-04 RX ADMIN — IBUPROFEN 800 MG: 800 TABLET, FILM COATED ORAL at 08:50

## 2020-07-04 RX ADMIN — HYDROCODONE BITARTRATE AND ACETAMINOPHEN 1 TABLET: 10; 325 TABLET ORAL at 08:50

## 2020-07-04 RX ADMIN — HYDROCODONE BITARTRATE AND ACETAMINOPHEN 1 TABLET: 10; 325 TABLET ORAL at 00:21

## 2020-07-04 RX ADMIN — IBUPROFEN 800 MG: 800 TABLET, FILM COATED ORAL at 02:06

## 2020-07-04 NOTE — PROGRESS NOTES
Post-Operative Day Number 1 Progress Note    Patient doing well post-op day 1 from  delivery without significant complaints. Pain controlled on current medication. Voiding without difficulty, normal lochia. Vitals:    Patient Vitals for the past 8 hrs:   BP Temp Pulse Resp SpO2   20 0657 107/69 98 °F (36.7 °C) 87 16 98 %     Temp (24hrs), Av.1 °F (36.7 °C), Min:98 °F (36.7 °C), Max:98.2 °F (36.8 °C)      Vital signs stable, afebrile. Exam:  Patient without distress. Abdomen soft, fundus firm at level of umbilicus, approp tender. Incision dry and clean without erythema. Lower extremities are negative for swelling, cords or tenderness. Lab/Data Review: All lab results for the last 24 hours reviewed. Assessment and Plan:  Patient appears to be having uncomplicated post- course. Continue routine post-op care and maternal education. Meeting all post op goals. Patient considering discharge tomorrow on POD#2 if no issues arise overnight. Reviewed discharge instructions, post op restrictions and indications for follow up today at rounds. Pt may be discharged tomorrow prior to MD rounds if desired.

## 2020-07-04 NOTE — PROGRESS NOTES
Care Management Interventions  PCP Verified by CM: Yes(Provided PCP list)  Current Support Network: Lives with Spouse  Discharge Location  Discharge Placement: Home  Spoke with spouse as patient was lactating. Provided and discussed  Postpartum Depression packet and [facility] Visit brochure.   Provided SW contact information and PCP list.

## 2020-07-04 NOTE — DISCHARGE INSTRUCTIONS
Patient Education   Discharge instruction to follow: Activity: Pelvis rest for 6 weeks     No heavy lifting over 15 lbs for 2 weeks     No driving for 2 weeks     No push/pull motion such as sweeping or vacuuming for 2 weeks     No tub baths for 6 weeks     section keep incision clean and dry, may shower as normal with soap and water. Inspect incision every day for signs of infection listed below. Continue to use toro-bottle with every void or bowel movement until comfortable stopping. Change sanitary pad after each urination or bowel movement. Call MD for the following:      Fever over 101 F; pain not relieved by medication; foul smelling vaginal discharge or increase in vaginal bleeding. Redness, swelling, or drainage from  incision. Take medication as prescribed. Follow up with MD as order.  Section: What to Expect at [address] [place] [address]     A  section, or , is surgery to deliver your baby through a cut, called an incision, that the doctor makes in your lower belly and uterus. You may have some pain in your lower belly and need pain medicine for 1 to 2 weeks. You can expect some vaginal bleeding for several weeks. You will probably need about 6 weeks to fully recover. It is important to take it easy while the incision is healing. Avoid heavy lifting, strenuous activities, or exercises that strain the belly muscles while you are recovering. Ask a family member or friend for help with housework, cooking, and shopping. This care sheet gives you a general idea about how long it will take for you to recover. But each person recovers at a different pace. Follow the steps below to get better as quickly as possible. How can you care for yourself at home? Activity  · Rest when you feel tired. Getting enough sleep will help you recover. · Try to walk each day. Start by walking a little more than you did the day before.  Bit by bit, increase the amount you walk. Walking boosts blood flow and helps prevent pneumonia, constipation, and blood clots. · Avoid strenuous activities, such as bicycle riding, jogging, weightlifting, and aerobic exercise, for 6 weeks or until your doctor says it is okay. · Until your doctor says it is okay, do not lift anything heavier than your baby. · Do not do sit-ups or other exercises that strain the belly muscles for 6 weeks or until your doctor says it is okay. · Hold a pillow over your incision when you cough or take deep breaths. This will support your belly and decrease your pain. · You may shower as usual. Pat the incision dry when you are done. · You will have some vaginal bleeding. Wear sanitary pads. Do not douche or use tampons until your doctor says it is okay. · Ask your doctor when you can drive again. · You will probably need to take at least 6 weeks off work. It depends on the type of work you do and how you feel. · Ask your doctor when it is okay for you to have sex. Diet  · You can eat your normal diet. If your stomach is upset, try bland, low-fat foods like plain rice, broiled chicken, toast, and yogurt. · Drink plenty of fluids (unless your doctor tells you not to). · You may notice that your bowel movements are not regular right after your surgery. This is common. Try to avoid constipation and straining with bowel movements. You may want to take a fiber supplement every day. If you have not had a bowel movement after a couple of days, ask your doctor about taking a mild laxative. · If you are breastfeeding, limit alcohol. Alcohol can cause a lack of energy and other health problems for the baby when a breastfeeding woman drinks heavily. It can also get in the way of a mom's ability to feed her baby or to care for the child in other ways. There isn't a lot of research about exactly how much alcohol can harm a baby. Having no alcohol is the safest choice for your baby.  If you choose to have a drink now and then, have only one drink, and limit the number of occasions that you have a drink. Wait to breastfeed at least 2 hours after you have a drink to reduce the amount of alcohol the baby may get in the milk. Medicines  · Your doctor will tell you if and when you can restart your medicines. He or she will also give you instructions about taking any new medicines. · If you take aspirin or some other blood thinner, ask your doctor if and when to start taking it again. Make sure that you understand exactly what your doctor wants you to do. · Take pain medicines exactly as directed. ? If the doctor gave you a prescription medicine for pain, take it as prescribed. ? If you are not taking a prescription pain medicine, ask your doctor if you can take an over-the-counter medicine. · If you think your pain medicine is making you sick to your stomach:  ? Take your medicine after meals (unless your doctor has told you not to). ? Ask your doctor for a different pain medicine. · If your doctor prescribed antibiotics, take them as directed. Do not stop taking them just because you feel better. You need to take the full course of antibiotics. Incision care  · If you have strips of tape on the incision, leave the tape on for a week or until it falls off. · Wash the area daily with warm, soapy water, and pat it dry. Don't use hydrogen peroxide or alcohol, which can slow healing. You may cover the area with a gauze bandage if it weeps or rubs against clothing. Change the bandage every day. · Keep the area clean and dry. Other instructions  · If you breastfeed your baby, you may be more comfortable while you are healing if you place the baby so that he or she is not resting on your belly. Try tucking your baby under your arm, with his or her body along the side you will be feeding on. Support your baby's upper body with your arm.  With that hand you can control your baby's head to bring his or her mouth to your breast. This is sometimes called the football hold. Follow-up care is a key part of your treatment and safety. Be sure to make and go to all appointments, and call your doctor if you are having problems. It's also a good idea to know your test results and keep a list of the medicines you take. When should you call for help? Call  911 anytime you think you may need emergency care. For example, call if:  · You have thoughts of harming yourself, your baby, or another person. · You passed out (lost consciousness). · You have chest pain, are short of breath, or cough up blood. · You have a seizure. Call your doctor now or seek immediate medical care if:  · You have pain that does not get better after you take pain medicine. · You have severe vaginal bleeding. · You are dizzy or lightheaded, or you feel like you may faint. · You have new or worse pain in your belly or pelvis. · You have loose stitches, or your incision comes open. · You have symptoms of infection, such as:  ? Increased pain, swelling, warmth, or redness. ? Red streaks leading from the incision. ? Pus draining from the incision. ? A fever. · You have symptoms of a blood clot in your leg (called a deep vein thrombosis), such as:  ? Pain in your calf, back of the knee, thigh, or groin. ? Redness and swelling in your leg or groin. · You have signs of preeclampsia, such as:  ? Sudden swelling of your face, hands, or feet. ? New vision problems (such as dimness, blurring, or seeing spots). ? A severe headache. Watch closely for changes in your health, and be sure to contact your doctor if:  · You do not get better as expected. Where can you learn more? Go to http://guillermina-rico.info/  Enter M806 in the search box to learn more about \" Section: What to Expect at Home. \"  Current as of: 2020               Content Version: 12.5  © 1656-9811 [employer].    Care instructions adapted under license by [employer] (which disclaims liability or warranty for this information). If you have questions about a medical condition or this instruction, always ask your healthcare professional. [address] any warranty or liability for your use of this information.

## 2020-07-04 NOTE — LACTATION NOTE
Lactation visit. Baby nursing more on right breast now, still latching well to left breast. Weight down 10% so mom supplementing formula at all feeds now too. Has not been pumping. Reviewed supply and demand and since baby needing supplement due to weigh loss, would encourage pumping after all feeds. Mom agreeable. Has pump for home use. Recommended triple feeding until pediatrician follow up. Breastfeed first at all feeds. Pump post nursing and give back pumped milk plus formula supplement. Feeding plan given and reviewed. Copy for home given. Questions answered. Mom notes small crack on left nipple. Reviewed sore nipple protocol. Rotate lanolin and bacitracin.

## 2020-07-09 NOTE — DISCHARGE SUMMARY
Obstetrical Discharge Summary     Name: Maddy Sheikh MRN: [id number]  SSN: [id number]    YOB: 2000  Age: 22 y.o. Sex: female      Allergies: Latex    Admit Date: 2020    Discharge Date: 2020     Admitting Physician: Shaunna Jang MD     Attending Physician:  No att. providers found     * Admission Diagnoses: 39 weeks gestation of pregnancy [Z3A.39]; Encounter for induction of labor [Z34.90]    * Discharge Diagnoses:   Information for the patient's :  Ladi Ng [[id number]]   Delivery of a 7 lb 11.8 oz (3.51 kg) female infant via , Low Transverse on 2020 at 12:56 AM  by Bal Cherry. Apgars were 8  and 9 . Additional Diagnoses:   Hospital Problems as of 2020 Date Reviewed: 2020          Codes Class Noted - Resolved POA    39 weeks gestation of pregnancy ICD-10-CM: Z3A.39  ICD-9-CM: V22.2  2020 - Present Yes        Encounter for induction of labor ICD-10-CM: Z34.90  ICD-9-CM: V22.1  2020 - Present Yes        * (Principal) Proteinuria affecting pregnancy in third trimester ICD-10-CM: O12.13  ICD-9-CM: 646.23, 791.0  2020 - Present Yes    Overview Addendum 2020  8:57 AM by Michelle Penn MD     - 2+ on OBV, PCR was > 2000. Concurrently dx'd with UTI, started treatment a few days later.   - 2020 24h urine __= 330mg/24h_______________   - PIH labs = WNL             Anemia affecting pregnancy in third trimester ICD-10-CM: O99.013  ICD-9-CM: 648.23, 285.9  4/15/2020 - Present Yes    Overview Addendum 2020 10:26 AM by Michelle Penn MD     - Hgb 9.9 @ 28wks, - fe started 4/15/2020 28w2d  - recheck 28- 34w4d down to 9.6, referred to H/O             Normal pregnancy, first ICD-10-CM: Z34.00  ICD-9-CM: V22.0  2020 - Present Yes             Lab Results   Component Value Date/Time    ABO/Rh(D) O POSITIVE 2020 07:35 PM      Immunization History   Administered Date(s) Administered    DTaP 1998, 1999, 1999, 1999    Hep B Vaccine (Adult) 2017    Hep B, Adol/Ped 1999, 2009    Hib 1998, 1999, 1999, 10/20/1999    IPV 1998, 1999, 10/20/1999    MMRV 2017    Tdap 2017       * Procedures: PLTCD  Procedure(s):   SECTION      [place]  Depression Scale  I have been able to laugh and see the funny side of things: As much as I always could  I have looked forward with enjoyment to things: As much as I ever did  I have blamed myself unnecessarily when things went wrong: Not very often  I have been anxious or worried for no good reason: Yes, sometimes  I have felt scared or panicky for no very good reason: No, not at all  Things have been getting on top of me: No, I have been coping as well as ever  I have been so unhappy that I have had difficulty sleeping: No, not at all  I have felt sad or miserable: No, not at all  I have been so unhappy that I have been crying: No, never  The thought of harming myself has occurred to me: Never  Total Score: 3    * Discharge Condition: good    * Hospital Course: Normal hospital course following the delivery. * Disposition: Home    Discharge Medications:   Discharge Medication List as of 2020 10:27 AM      START taking these medications    Details   ibuprofen (MOTRIN) 800 mg tablet Take 1 Tab by mouth every six (6) hours as needed for Pain., Normal, Disp-60 Tab, R-2      HYDROcodone-acetaminophen (Norco) 5-325 mg per tablet Take 1 Tab by mouth every four (4) hours as needed for Pain for up to 5 days. Max Daily Amount: 6 Tabs., Normal, Disp-30 Tab, R-0         CONTINUE these medications which have NOT CHANGED    Details   amoxicillin (AMOXIL) 500 mg capsule Take 500 mg by mouth., Historical Med      PNV#16-Iron Fum & PS-FA-OM-3 35-1-200 mg cap Take  by mouth., Historical Med             * Follow-up Care/Patient Instructions:   Activity: Activity as tolerated, No lifting, Driving, or Strenuous exercise for 6 weeks, No sex for 6 weeks and No driving while on narcotic analgesics  Diet: Regular Diet  Wound Care: Keep wound clean and pat dry after shower    Follow-up Information     Follow up With Specialties Details Why Contact Jon Gee MD Obstetrics & Gynecology, Gynecology, Obstetrics Schedule an appointment as soon as possible for a visit in 2 weeks for postpartum checkup [address] [place] [address] [place].

## 2021-12-12 NOTE — PROGRESS NOTES
Dr Ely Ramsay into room. Sve. 1 cm but \"thinner\". Discussed caldera bulb and pitocin. Will do both. Improved.

## 2025-05-16 NOTE — PROGRESS NOTES
Shift assessment complete as noted. Patient denies needs. Questions encouraged and answered. Encouraged to call for needs or concerns. Verbalizes understanding. moderate assist (50% patients effort)

## (undated) DEVICE — SURGICAL PROCEDURE PACK C SECT CDS

## (undated) DEVICE — PENCIL ES L3M BTTN SWCH S STL HEX LOK BLDE ELECTRD HOLSTER

## (undated) DEVICE — SOLUTION IRRIG 1000ML H2O STRL BLT

## (undated) DEVICE — SUTURE PLN GUT SZ 2-0 L27IN ABSRB YELLOWISH TAN L40MM CT 853H

## (undated) DEVICE — REM POLYHESIVE ADULT PATIENT RETURN ELECTRODE: Brand: VALLEYLAB

## (undated) DEVICE — SUTURE PDS II SZ 0 L27IN ABSRB VLT L36MM CT-1 1/2 CIR Z340H

## (undated) DEVICE — KENDALL SCD EXPRESS SLEEVES, KNEE LENGTH, MEDIUM: Brand: KENDALL SCD

## (undated) DEVICE — STERILE POLYISOPRENE POWDER-FREE SURGICAL GLOVES: Brand: PROTEXIS

## (undated) DEVICE — (D)PREP SKN CHLRAPRP APPL 26ML -- CONVERT TO ITEM 371833

## (undated) DEVICE — SUTURE VCRL SZ 1 L27IN ABSRB UD CT-1 L36MM 1/2 CIR J261H

## (undated) DEVICE — CATH FOL TY IC BAG 16FR 2000ML -- CONVERT TO ITEM 363158

## (undated) DEVICE — MEDI-VAC NON-CONDUCTIVE SUCTION TUBING: Brand: CARDINAL HEALTH

## (undated) DEVICE — SUTURE ABSRB X-1 REV CUT 1/2 CIR 22MM UD BRAID 27IN SZ 3-0 J458H

## (undated) DEVICE — SUT CHRMC 1 27IN CT1 BRN --

## (undated) DEVICE — SUT CHRMC 0 27IN CT1 BRN --

## (undated) DEVICE — SOLUTION IV 1000ML 0.9% SOD CHL